# Patient Record
Sex: FEMALE | Race: WHITE | NOT HISPANIC OR LATINO | Employment: OTHER | URBAN - METROPOLITAN AREA
[De-identification: names, ages, dates, MRNs, and addresses within clinical notes are randomized per-mention and may not be internally consistent; named-entity substitution may affect disease eponyms.]

---

## 2019-01-15 ENCOUNTER — OFFICE VISIT (OUTPATIENT)
Dept: MEDICAL GROUP | Facility: PHYSICIAN GROUP | Age: 84
End: 2019-01-15
Payer: MEDICARE

## 2019-01-15 VITALS
HEART RATE: 86 BPM | WEIGHT: 171 LBS | OXYGEN SATURATION: 93 % | HEIGHT: 61 IN | DIASTOLIC BLOOD PRESSURE: 60 MMHG | SYSTOLIC BLOOD PRESSURE: 108 MMHG | RESPIRATION RATE: 14 BRPM | TEMPERATURE: 97.5 F | BODY MASS INDEX: 32.28 KG/M2

## 2019-01-15 DIAGNOSIS — M54.50 ACUTE RIGHT-SIDED LOW BACK PAIN WITHOUT SCIATICA: ICD-10-CM

## 2019-01-15 DIAGNOSIS — E66.9 OBESITY (BMI 30-39.9): ICD-10-CM

## 2019-01-15 DIAGNOSIS — E11.9 TYPE 2 DIABETES MELLITUS WITHOUT COMPLICATION, WITHOUT LONG-TERM CURRENT USE OF INSULIN (HCC): ICD-10-CM

## 2019-01-15 DIAGNOSIS — I87.2 VENOUS STASIS DERMATITIS OF LEFT LOWER EXTREMITY: ICD-10-CM

## 2019-01-15 DIAGNOSIS — K21.9 GASTROESOPHAGEAL REFLUX DISEASE WITHOUT ESOPHAGITIS: ICD-10-CM

## 2019-01-15 PROCEDURE — 99204 OFFICE O/P NEW MOD 45 MIN: CPT | Performed by: FAMILY MEDICINE

## 2019-01-15 RX ORDER — LISINOPRIL 10 MG/1
10 TABLET ORAL DAILY
COMMUNITY
End: 2019-03-20 | Stop reason: SDUPTHER

## 2019-01-15 RX ORDER — LIDOCAINE 50 MG/G
1 PATCH TOPICAL EVERY 24 HOURS
Qty: 10 PATCH | Refills: 1 | Status: SHIPPED | OUTPATIENT
Start: 2019-01-15 | End: 2019-01-15 | Stop reason: SDUPTHER

## 2019-01-15 RX ORDER — LIDOCAINE 50 MG/G
1 PATCH TOPICAL EVERY 24 HOURS
Qty: 10 PATCH | Refills: 1 | Status: SHIPPED | OUTPATIENT
Start: 2019-01-15 | End: 2019-04-16

## 2019-01-15 RX ORDER — CHOLECALCIFEROL (VITAMIN D3) 125 MCG
CAPSULE ORAL
COMMUNITY
End: 2020-03-06 | Stop reason: SDUPTHER

## 2019-01-15 RX ORDER — OMEPRAZOLE 20 MG/1
20 CAPSULE, DELAYED RELEASE ORAL DAILY
COMMUNITY
End: 2019-03-20 | Stop reason: SDUPTHER

## 2019-01-15 RX ORDER — FUROSEMIDE 20 MG/1
20 TABLET ORAL 2 TIMES DAILY
COMMUNITY
End: 2019-01-15

## 2019-01-15 RX ORDER — ALBUTEROL SULFATE 90 UG/1
2 AEROSOL, METERED RESPIRATORY (INHALATION) EVERY 6 HOURS PRN
COMMUNITY
End: 2019-05-23 | Stop reason: SDUPTHER

## 2019-01-15 RX ORDER — FUROSEMIDE 20 MG/1
20 TABLET ORAL 2 TIMES DAILY
COMMUNITY
End: 2019-03-20 | Stop reason: SDUPTHER

## 2019-01-15 ASSESSMENT — PATIENT HEALTH QUESTIONNAIRE - PHQ9: CLINICAL INTERPRETATION OF PHQ2 SCORE: 0

## 2019-01-15 NOTE — ASSESSMENT & PLAN NOTE
"She has had 4-5 days of right low back pain that started after she was coughing one night.  The pain is worse when she stands from a sitting position.  It feels like a \"catch.\"  She denies radiation of the pain.  She has taken naproxen with some relief.    "

## 2019-01-15 NOTE — PATIENT INSTRUCTIONS
"For the back:  Take tylenol 1000 mg bid  Take naproxen 220 mg twice a day to 500 mg twice a day  Consider lidocaine patch        Low Back Sprain Rehab  Ask your health care provider which exercises are safe for you. Do exercises exactly as told by your health care provider and adjust them as directed. It is normal to feel mild stretching, pulling, tightness, or discomfort as you do these exercises, but you should stop right away if you feel sudden pain or your pain gets worse. Do not begin these exercises until told by your health care provider.  Stretching and range of motion exercises  These exercises warm up your muscles and joints and improve the movement and flexibility of your back. These exercises also help to relieve pain, numbness, and tingling.  Exercise A: Lumbar rotation  1. Lie on your back on a firm surface and bend your knees.  2. Straighten your arms out to your sides so each arm forms an \"L\" shape with a side of your body (a 90 degree angle).  3. Slowly move both of your knees to one side of your body until you feel a stretch in your lower back. Try not to let your shoulders move off of the floor.  4. Hold for __________ seconds.  5. Tense your abdominal muscles and slowly move your knees back to the starting position.  6. Repeat this exercise on the other side of your body.  Repeat __________ times. Complete this exercise __________ times a day.  Exercise B: Prone extension on elbows  1. Lie on your abdomen on a firm surface.  2. Prop yourself up on your elbows.  3. Use your arms to help lift your chest up until you feel a gentle stretch in your abdomen and your lower back.  ¨ This will place some of your body weight on your elbows. If this is uncomfortable, try stacking pillows under your chest.  ¨ Your hips should stay down, against the surface that you are lying on. Keep your hip and back muscles relaxed.  4. Hold for __________ seconds.  5. Slowly relax your upper body and return to the " starting position.  Repeat __________ times. Complete this exercise __________ times a day.  Strengthening exercises  These exercises build strength and endurance in your back. Endurance is the ability to use your muscles for a long time, even after they get tired.  Exercise C: Pelvic tilt  1. Lie on your back on a firm surface. Bend your knees and keep your feet flat.  2. Tense your abdominal muscles. Tip your pelvis up toward the ceiling and flatten your lower back into the floor.  ¨ To help with this exercise, you may place a small towel under your lower back and try to push your back into the towel.  3. Hold for __________ seconds.  4. Let your muscles relax completely before you repeat this exercise.  Repeat __________ times. Complete this exercise __________ times a day.  Exercise D: Alternating arm and leg raises  1. Get on your hands and knees on a firm surface. If you are on a hard floor, you may want to use padding to cushion your knees, such as an exercise mat.  2. Line up your arms and legs. Your hands should be below your shoulders, and your knees should be below your hips.  3. Lift your left leg behind you. At the same time, raise your right arm and straighten it in front of you.  ¨ Do not lift your leg higher than your hip.  ¨ Do not lift your arm higher than your shoulder.  ¨ Keep your abdominal and back muscles tight.  ¨ Keep your hips facing the ground.  ¨ Do not arch your back.  ¨ Keep your balance carefully, and do not hold your breath.  4. Hold for __________ seconds.  5. Slowly return to the starting position and repeat with your right leg and your left arm.  Repeat __________ times. Complete this exercise __________ times a day.  Exercise E: Abdominal set with straight leg raise  1. Lie on your back on a firm surface.  2. Bend one of your knees and keep your other leg straight.  3. Tense your abdominal muscles and lift your straight leg up, 4-6 inches (10-15 cm) off the ground.  4. Keep your  abdominal muscles tight and hold for __________ seconds.  ¨ Do not hold your breath.  ¨ Do not arch your back. Keep it flat against the ground.  5. Keep your abdominal muscles tense as you slowly lower your leg back to the starting position.  6. Repeat with your other leg.  Repeat __________ times. Complete this exercise __________ times a day.  Posture and body mechanics     Body mechanics refers to the movements and positions of your body while you do your daily activities. Posture is part of body mechanics. Good posture and healthy body mechanics can help to relieve stress in your body's tissues and joints. Good posture means that your spine is in its natural S-curve position (your spine is neutral), your shoulders are pulled back slightly, and your head is not tipped forward. The following are general guidelines for applying improved posture and body mechanics to your everyday activities.  Standing    · When standing, keep your spine neutral and your feet about hip-width apart. Keep a slight bend in your knees. Your ears, shoulders, and hips should line up.  · When you do a task in which you  one place for a long time, place one foot up on a stable object that is 2-4 inches (5-10 cm) high, such as a footstool. This helps keep your spine neutral.  Sitting  · When sitting, keep your spine neutral and keep your feet flat on the floor. Use a footrest, if necessary, and keep your thighs parallel to the floor. Avoid rounding your shoulders, and avoid tilting your head forward.  · When working at a desk or a computer, keep your desk at a height where your hands are slightly lower than your elbows. Slide your chair under your desk so you are close enough to maintain good posture.  · When working at a computer, place your monitor at a height where you are looking straight ahead and you do not have to tilt your head forward or downward to look at the screen.  Resting    · When lying down and resting, avoid  positions that are most painful for you.  · If you have pain with activities such as sitting, bending, stooping, or squatting (flexion-based activities), lie in a position in which your body does not bend very much. For example, avoid curling up on your side with your arms and knees near your chest (fetal position).  · If you have pain with activities such as standing for a long time or reaching with your arms (extension-based activities), lie with your spine in a neutral position and bend your knees slightly. Try the following positions:  · Lying on your side with a pillow between your knees.  · Lying on your back with a pillow under your knees.  Lifting    · When lifting objects, keep your feet at least shoulder-width apart and tighten your abdominal muscles.  · Bend your knees and hips and keep your spine neutral. It is important to lift using the strength of your legs, not your back. Do not lock your knees straight out.  · Always ask for help to lift heavy or awkward objects.  This information is not intended to replace advice given to you by your health care provider. Make sure you discuss any questions you have with your health care provider.  Document Released: 12/18/2006 Document Revised: 08/24/2017 Document Reviewed: 09/28/2016  Elsevier Interactive Patient Education © 2017 [employer]

## 2019-01-15 NOTE — ASSESSMENT & PLAN NOTE
She takes metformin twice a day, 500 mg.  She reports her fasting glucose is 118-135.  She is on lisinopril for kidney protection.

## 2019-01-15 NOTE — ASSESSMENT & PLAN NOTE
She was started on lasix for this.  She reports some improvement but the skin is still red. She has a steroid cream to use.

## 2019-01-15 NOTE — PROGRESS NOTES
"CC:  Back pain    HISTORY OF THE PRESENT ILLNESS: Patient is a 83 y.o. female. This pleasant patient is here today to discuss the following    Health Maintenance: Completed      Acute right-sided low back pain without sciatica  She has had 4-5 days of right low back pain that started after she was coughing one night.  The pain is worse when she stands from a sitting position.  It feels like a \"catch.\"  She denies radiation of the pain.  She has taken naproxen with some relief.      Type 2 diabetes mellitus without complication, without long-term current use of insulin ([facility])  She takes metformin twice a day, 500 mg.  She reports her fasting glucose is 118-135.  She is on lisinopril for kidney protection.    Venous stasis dermatitis of left lower extremity  She was started on lasix for this.  She reports some improvement but the skin is still red. She has a steroid cream to use.      Gastroesophageal reflux disease without esophagitis  This is stable on omeprazole.  She denies symptoms.       Allergies: Patient has no known allergies.    Current Outpatient Prescriptions Ordered in [facility]   Medication Sig Dispense Refill   • metFORMIN (GLUCOPHAGE) 500 MG Tab Take 500 mg by mouth 2 times a day, with meals.     • omeprazole (PRILOSEC) 20 MG delayed-release capsule Take 20 mg by mouth every day.     • lisinopril (PRINIVIL) 10 MG Tab Take 10 mg by mouth every day.     • Melatonin 5 MG Tab Take  by mouth.     • furosemide (LASIX) 20 MG Tab Take 20 mg by mouth 2 times a day.     • albuterol (PROVENTIL) 2.5mg/0.5ml Nebu Soln 2.5 mg by Nebulization route every four hours as needed.     • albuterol 108 (90 Base) MCG/ACT Aero Soln inhalation aerosol Inhale 2 Puffs by mouth every 6 hours as needed.     • lidocaine (LIDODERM) 5 % Patch Apply 1 Patch to skin as directed every 24 hours. 10 Patch 1     No current Epic-ordered facility-administered medications on file.        Past Medical History:   Diagnosis Date   • Pneumonia     four " times       Past Surgical History:   Procedure Laterality Date   • CHOLECYSTECTOMY  1961   • ABDOMINAL SUBTOTAL HYSTERECTOMY      ovaries remain   • TONSILLECTOMY         Social History   Substance Use Topics   • Smoking status: Former Smoker     Years: 10.00     Types: Cigarettes     Quit date: 1969   • Smokeless tobacco: Never Used   • Alcohol use No       Social History     Social History Narrative    Retired - housekeeping       Family History   Problem Relation Age of Onset   • Cancer Mother         stomach   • Heart Disease Father 65   • Heart Disease Sister    • Cancer Sister         uterine   • Diabetes Brother    • Diabetes Brother    • Heart Attack Brother    • Heart Disease Brother         CABG       ROS:     - Constitutional: Negative for fever, chills, unexpected weight change, and fatigue/generalized weakness.     - HEENT: Negative for headaches, vision changes, hearing changes, ear pain, ear discharge, rhinorrhea, sinus congestion, sore throat, and neck pain.      - Respiratory: Negative for cough, sputum production, chest congestion, dyspnea, wheezing, and crackles.      - Cardiovascular: Negative for chest pain, palpitations, orthopnea, and bilateral lower extremity edema.     - Gastrointestinal: Negative for heartburn, nausea, vomiting, abdominal pain, hematochezia, melena, diarrhea, constipation, and greasy/foul-smelling stools.     - Genitourinary: Negative for dysuria, polyuria, hematuria, pyuria, urinary urgency, and urinary incontinence.    - Musculoskeletal: Negative for myalgias, and joint pain.     - Skin: Negative for rash, itching, cyanotic skin color change.     - Neurological: Negative for dizziness, tingling, tremors, focal sensory deficit, focal weakness and headaches.     - Endo/Heme/Allergies: Does not bruise/bleed easily.     - Psychiatric/Behavioral: Negative for depression, suicidal/homicidal ideation and memory loss.      Exam: Blood pressure 108/60, pulse 86, temperature 36.4  "°C (97.5 °F), resp. rate 14, height 1.549 m (5' 1\"), weight 77.6 kg (171 lb), SpO2 93 %. Body mass index is 32.31 kg/m².    General: Normal appearing. No distress.  HEENT: Normocephalic. Eyes conjunctiva clear lids without ptosis, pupils equal and reactive to light accommodation, ears normal shape and contour, canals are clear bilaterally, tympanic membranes are benign, nasal mucosa benign, oropharynx is without erythema, edema or exudates.   Neck: Supple without JVD or bruit. Thyroid is not enlarged.  Pulmonary: Clear to ausculation.  Normal effort. No rales, ronchi, or wheezing.  Cardiovascular: Regular rate and rhythm without murmur. Carotid and radial pulses are intact and equal bilaterally.  Abdomen: Soft, nontender, nondistended. Normal bowel sounds. Liver and spleen are not palpable  Neurologic: Grossly nonfocal  Lymph: No cervical, supraclavicular or axillary lymph nodes are palpable  Skin: Warm and dry.  Left lower leg with venous stasis dermatitis and varicose veins  Musculoskeletal: Normal gait. No extremity cyanosis, clubbing, or edema.  Psych: Normal mood and affect. Alert and oriented x3. Judgment and insight is normal.    Please note that this dictation was created using voice recognition software. I have made every reasonable attempt to correct obvious errors, but I expect that there are errors of grammar and possibly content that I did not discover before finalizing the note.      Assessment/Plan  1. Acute right-sided low back pain without sciatica  Given mild recurrent nature will postpone imaging.  Discussed management with OTC analgesics, may try lidocaine.  Exam is reassuring.  If pain persists will need images.  No \"RED FLAGS’ for fracture: (Recent major trauma, Minor trauma or strenuous lifting in older or osteoporotic patient, Hx of chronic corticosteroid therapy)  No \"RED FLAGS\" for neoplasm or infection except age: ( Hx of cancer, millie. breast, lung, prostate, Recent fever/chills, Recent " "unexplained weight loss,  Recent bacterial infection, current IV drug use, Immunosuppression (from meds, HIV, etc.), Pain worse when supine or at night)  No \"RED FLAGS’ for cauda equina syndroma: (Saddle anesthesia, urine retention, fecal incontinence, Severe or progressive LE neurologic deficit)    - lidocaine (LIDODERM) 5 % Patch; Apply 1 Patch to skin as directed every 24 hours.  Dispense: 10 Patch; Refill: 1    2. Type 2 diabetes mellitus without complication, without long-term current use of insulin ([facility])  Her fasting glucose is in range.  WE will order labs and continue her current medications.  She is not on a statin, will assess lipids prior to considering.  - HEMOGLOBIN A1C; Future  - COMP METABOLIC PANEL; Future  - MICROALBUMIN CREAT RATIO URINE; Future  - Lipid Profile; Future    3. Venous stasis dermatitis of left lower extremity  She has previously been prescribed a topical steroid which is appropriate.  Will continue daily lasix for now, discussed use of compression socks.    4. Gastroesophageal reflux disease without esophagitis  This is stable, no alarm features.    5. Obesity (BMI 30-39.9)  - Patient identified as having weight management issue.  Appropriate orders and counseling given.    "

## 2019-01-15 NOTE — LETTER
[facility]  Donna Cyr M.D.  [address]  [facility] [address]  Fax: [phone]   Authorization for Release/Disclosure of   Protected Health Information   Name: LEISA WATKINS : 1935 SSN: [id number]   Address: [address] [place] [address] Phone:    [phone] (home)    I authorize the entity listed below to release/disclose the PHI below to:   [facility]/Donna Cyr M.D. and Donna Cyr M.D.   Provider or Entity Name:     Address   City, State, [facility]   Phone:      Fax:     Reason for request: continuity of care   Information to be released:    [  ] LAST COLONOSCOPY,  including any PATH REPORT and follow-up  [  ] LAST FIT/COLOGUARD RESULT [  ] LAST DEXA  [  ] LAST MAMMOGRAM  [  ] LAST PAP  [  ] LAST LABS [  ] RETINA EXAM REPORT  [  ] IMMUNIZATION RECORDS  [ X ] Release all info      [  ] Check here and initial the line next to each item to release ALL health information INCLUDING  _____ Care and treatment for drug and / or alcohol abuse  _____ HIV testing, infection status, or AIDS  _____ Genetic Testing    DATES OF SERVICE OR TIME PERIOD TO BE DISCLOSED: _____________  I understand and acknowledge that:  * This Authorization may be revoked at any time by you in writing, except if your health information has already been used or disclosed.  * Your health information that will be used or disclosed as a result of you signing this authorization could be re-disclosed by the recipient. If this occurs, your re-disclosed health information may no longer be protected by State or Federal laws.  * You may refuse to sign this Authorization. Your refusal will not affect your ability to obtain treatment.  * This Authorization becomes effective upon signing and will  on (date) __________.      If no date is indicated, this Authorization will  one (1) year from the signature date.    Name: Leisa Watkins    Signature:   Date:     1/15/2019       PLEASE FAX  REQUESTED RECORDS BACK TO: [phone]

## 2019-01-28 ENCOUNTER — HOSPITAL ENCOUNTER (OUTPATIENT)
Dept: LAB | Facility: MEDICAL CENTER | Age: 84
End: 2019-01-28
Attending: FAMILY MEDICINE
Payer: MEDICARE

## 2019-01-28 DIAGNOSIS — E11.9 TYPE 2 DIABETES MELLITUS WITHOUT COMPLICATION, WITHOUT LONG-TERM CURRENT USE OF INSULIN (HCC): ICD-10-CM

## 2019-01-28 LAB
ALBUMIN SERPL BCP-MCNC: 3.9 G/DL (ref 3.2–4.9)
ALBUMIN/GLOB SERPL: 1.3 G/DL
ALP SERPL-CCNC: 80 U/L (ref 30–99)
ALT SERPL-CCNC: 12 U/L (ref 2–50)
ANION GAP SERPL CALC-SCNC: 8 MMOL/L (ref 0–11.9)
AST SERPL-CCNC: 23 U/L (ref 12–45)
BILIRUB SERPL-MCNC: 0.5 MG/DL (ref 0.1–1.5)
BUN SERPL-MCNC: 18 MG/DL (ref 8–22)
CALCIUM SERPL-MCNC: 8.9 MG/DL (ref 8.5–10.5)
CHLORIDE SERPL-SCNC: 104 MMOL/L (ref 96–112)
CHOLEST SERPL-MCNC: 209 MG/DL (ref 100–199)
CO2 SERPL-SCNC: 29 MMOL/L (ref 20–33)
CREAT SERPL-MCNC: 0.82 MG/DL (ref 0.5–1.4)
CREAT UR-MCNC: 31.5 MG/DL
EST. AVERAGE GLUCOSE BLD GHB EST-MCNC: 146 MG/DL
GLOBULIN SER CALC-MCNC: 2.9 G/DL (ref 1.9–3.5)
GLUCOSE SERPL-MCNC: 110 MG/DL (ref 65–99)
HBA1C MFR BLD: 6.7 % (ref 0–5.6)
HDLC SERPL-MCNC: 57 MG/DL
LDLC SERPL CALC-MCNC: 138 MG/DL
MICROALBUMIN UR-MCNC: <0.7 MG/DL
MICROALBUMIN/CREAT UR: NORMAL MG/G (ref 0–30)
POTASSIUM SERPL-SCNC: 4.2 MMOL/L (ref 3.6–5.5)
PROT SERPL-MCNC: 6.8 G/DL (ref 6–8.2)
SODIUM SERPL-SCNC: 141 MMOL/L (ref 135–145)
TRIGL SERPL-MCNC: 69 MG/DL (ref 0–149)

## 2019-01-28 PROCEDURE — 82043 UR ALBUMIN QUANTITATIVE: CPT

## 2019-01-28 PROCEDURE — 83036 HEMOGLOBIN GLYCOSYLATED A1C: CPT | Mod: GA

## 2019-01-28 PROCEDURE — 82570 ASSAY OF URINE CREATININE: CPT

## 2019-01-28 PROCEDURE — 80053 COMPREHEN METABOLIC PANEL: CPT

## 2019-01-28 PROCEDURE — 80061 LIPID PANEL: CPT

## 2019-01-28 PROCEDURE — 36415 COLL VENOUS BLD VENIPUNCTURE: CPT | Mod: GA

## 2019-01-30 PROBLEM — E78.2 MIXED HYPERLIPIDEMIA: Status: ACTIVE | Noted: 2019-01-30

## 2019-02-05 ENCOUNTER — TELEPHONE (OUTPATIENT)
Dept: MEDICAL GROUP | Facility: PHYSICIAN GROUP | Age: 84
End: 2019-02-05

## 2019-02-05 NOTE — TELEPHONE ENCOUNTER
Patient called and wanted her lab results. In the results, Dr. Cyr advised she would discuss at her next appt. I advised the patient of the note, I also advised her of the appt that is scheduled. I asked if she would like to be seen earlier, she declined.

## 2019-02-20 ENCOUNTER — TELEPHONE (OUTPATIENT)
Dept: MEDICAL GROUP | Facility: PHYSICIAN GROUP | Age: 84
End: 2019-02-20

## 2019-02-20 NOTE — TELEPHONE ENCOUNTER
MEDICATION PRIOR AUTHORIZATION NEEDED:    1. Name of Medication: Lidocaine patch    2. Requested By (Name of Pharmacy): [facility]     3. Is insurance on file current? Yes    4. What is the name & phone number of the 3rd party payor? N/A    This was completed on 01/2019. This was denied. This is scanned into media.

## 2019-03-20 DIAGNOSIS — E78.2 MIXED HYPERLIPIDEMIA: ICD-10-CM

## 2019-03-20 RX ORDER — FUROSEMIDE 20 MG/1
20 TABLET ORAL 2 TIMES DAILY
Qty: 180 TAB | Refills: 1 | Status: SHIPPED | OUTPATIENT
Start: 2019-03-20 | End: 2019-09-24

## 2019-03-20 RX ORDER — OMEPRAZOLE 20 MG/1
20 CAPSULE, DELAYED RELEASE ORAL DAILY
Qty: 90 CAP | Refills: 1 | Status: SHIPPED | OUTPATIENT
Start: 2019-03-20 | End: 2019-05-08 | Stop reason: SDUPTHER

## 2019-03-20 RX ORDER — LISINOPRIL 10 MG/1
10 TABLET ORAL DAILY
Qty: 90 TAB | Refills: 1 | Status: SHIPPED | OUTPATIENT
Start: 2019-03-20 | End: 2019-05-08 | Stop reason: SDUPTHER

## 2019-03-20 NOTE — TELEPHONE ENCOUNTER
I have reviewed the patient's chart and she is up to date on appointments and labs.  Refills are appropriate and were signed.

## 2019-04-16 ENCOUNTER — OFFICE VISIT (OUTPATIENT)
Dept: MEDICAL GROUP | Facility: PHYSICIAN GROUP | Age: 84
End: 2019-04-16
Payer: MEDICARE

## 2019-04-16 VITALS
HEART RATE: 71 BPM | SYSTOLIC BLOOD PRESSURE: 112 MMHG | BODY MASS INDEX: 32.59 KG/M2 | OXYGEN SATURATION: 94 % | DIASTOLIC BLOOD PRESSURE: 68 MMHG | TEMPERATURE: 99.1 F | RESPIRATION RATE: 16 BRPM | WEIGHT: 166 LBS | HEIGHT: 60 IN

## 2019-04-16 DIAGNOSIS — M25.511 ACUTE PAIN OF RIGHT SHOULDER: ICD-10-CM

## 2019-04-16 DIAGNOSIS — E11.9 TYPE 2 DIABETES MELLITUS WITHOUT COMPLICATION, WITHOUT LONG-TERM CURRENT USE OF INSULIN (HCC): ICD-10-CM

## 2019-04-16 DIAGNOSIS — I87.2 VENOUS STASIS DERMATITIS OF LEFT LOWER EXTREMITY: ICD-10-CM

## 2019-04-16 DIAGNOSIS — E78.2 MIXED HYPERLIPIDEMIA: ICD-10-CM

## 2019-04-16 PROBLEM — M54.50 ACUTE RIGHT-SIDED LOW BACK PAIN WITHOUT SCIATICA: Status: RESOLVED | Noted: 2019-01-15 | Resolved: 2019-04-16

## 2019-04-16 LAB
HBA1C MFR BLD: 6.7 % (ref 0–5.6)
INT CON NEG: ABNORMAL
INT CON POS: ABNORMAL

## 2019-04-16 PROCEDURE — 83036 HEMOGLOBIN GLYCOSYLATED A1C: CPT | Performed by: FAMILY MEDICINE

## 2019-04-16 PROCEDURE — 99214 OFFICE O/P EST MOD 30 MIN: CPT | Performed by: FAMILY MEDICINE

## 2019-04-16 ASSESSMENT — PATIENT HEALTH QUESTIONNAIRE - PHQ9: CLINICAL INTERPRETATION OF PHQ2 SCORE: 0

## 2019-04-16 NOTE — ASSESSMENT & PLAN NOTE
She is on metformin 500 mg twice a day.    Lab Results   Component Value Date/Time    HBA1C 6.7 (A) 04/16/2019 02:07 PM    HBA1C 6.7 (H) 01/28/2019 09:37 AM       Fasting glucose is running in the 120s or less.

## 2019-04-16 NOTE — PROGRESS NOTES
CC: shoulder pain    HISTORY OF PRESENT ILLNESS: Patient is a 83 y.o. female established patient who presents today to discuss the following new and chronic conditions    Health Maintenance: Completed    Type 2 diabetes mellitus without complication, without long-term current use of insulin ([facility])  She is on metformin 500 mg twice a day.    Lab Results   Component Value Date/Time    HBA1C 6.7 (A) 04/16/2019 02:07 PM    HBA1C 6.7 (H) 01/28/2019 09:37 AM       Fasting glucose is running in the 120s or less.      Acute pain of right shoulder  She has right shoulder pain that started a few days ago.  She woke up with pain in the shoulder.  The pain improved with aspercream but has not resolved.  The pain is worse with movement.  The pain starts in her neck and is mostly over the scapula.  She denies changes in numbness or weakness but does have stable carpal tunnel.      Mixed hyperlipidemia  Lab Results   Component Value Date/Time    CHOLSTRLTOT 209 (H) 01/28/2019 09:37 AM     (H) 01/28/2019 09:37 AM    HDL 57 01/28/2019 09:37 AM    TRIGLYCERIDE 69 01/28/2019 09:37 AM       She is likely at increased risk given the diagnosis of diabetes and her age.  She had not been on a statin due to her age.        Patient Active Problem List    Diagnosis Date Noted   • Acute pain of right shoulder 04/16/2019   • Mixed hyperlipidemia 01/30/2019   • Type 2 diabetes mellitus without complication, without long-term current use of insulin ([facility]) 01/15/2019   • Venous stasis dermatitis of left lower extremity 01/15/2019   • Gastroesophageal reflux disease without esophagitis 01/15/2019   • Obesity (BMI 30-39.9) 01/15/2019      Allergies:Patient has no known allergies.    Current Outpatient Prescriptions   Medication Sig Dispense Refill   • furosemide (LASIX) 20 MG Tab Take 1 Tab by mouth 2 times a day. 180 Tab 1   • metFORMIN (GLUCOPHAGE) 500 MG Tab Take 1 Tab by mouth 2 times a day, with meals. 180 Tab 1   • lisinopril (PRINIVIL)  10 MG Tab Take 1 Tab by mouth every day. 90 Tab 1   • omeprazole (PRILOSEC) 20 MG delayed-release capsule Take 1 Cap by mouth every day. 90 Cap 1   • Melatonin 5 MG Tab Take  by mouth.     • albuterol (PROVENTIL) 2.5mg/0.5ml Nebu Soln 2.5 mg by Nebulization route every four hours as needed.     • albuterol 108 (90 Base) MCG/ACT Aero Soln inhalation aerosol Inhale 2 Puffs by mouth every 6 hours as needed.       No current facility-administered medications for this visit.        Social History   Substance Use Topics   • Smoking status: Former Smoker     Years: 10.00     Types: Cigarettes     Quit date: 1969   • Smokeless tobacco: Never Used   • Alcohol use No     Social History     Social History Narrative    Retired - housekeeping       Family History   Problem Relation Age of Onset   • Cancer Mother         stomach   • Heart Disease Father 65   • Heart Disease Sister    • Cancer Sister         uterine   • Diabetes Brother    • Diabetes Brother    • Heart Attack Brother    • Heart Disease Brother         CABG       Review of Systems:      - Constitutional: Negative for fever, chills, unexpected weight change, and fatigue/generalized weakness.     - Respiratory: Negative for cough, sputum production, chest congestion, dyspnea, wheezing, and crackles.      - Cardiovascular: Negative for chest pain, palpitations, orthopnea, and bilateral lower extremity edema.     - Gastrointestinal: Negative for heartburn, nausea, vomiting, abdominal pain, hematochezia, melena, diarrhea, constipation, and greasy/foul-smelling stools.     - Genitourinary: Negative for dysuria, polyuria, hematuria, pyuria, urinary urgency, and urinary incontinence.    - Neurological: Negative for dizziness, tingling, tremors, focal weakness and headaches.         Exam:    /68 (BP Location: Left arm, Patient Position: Sitting, BP Cuff Size: Adult)   Pulse 71   Temp 37.3 °C (99.1 °F) (Temporal)   Resp 16   Ht 1.524 m (5')   Wt 75.3 kg (166 lb)    SpO2 94%  Body mass index is 32.42 kg/m².    General:  Well nourished, well developed female in NAD  Head is grossly normal.  Neck: Supple without JVD or bruit. Thyroid is not enlarged.  Pulmonary: Clear to ausculation and percussion.  Normal effort. No rales, ronchi, or wheezing.  Cardiovascular: Regular rate and rhythm without murmur. Carotid and radial pulses are intact and equal bilaterally.  Musculoskeletal: tenderness along the trapezius muscle on the right, no deformity or palpable abnormality, ROM of shoulder restricted passively and actively due to pain, unable to abduct above 90 degrees  Extremities: no clubbing, cyanosis, or edema.  Monofilament testing with a 10 gram force: sensation intact: intact bilaterally  Visual Inspection: Feet without maceration, ulcers, fissures.  Pedal pulses: decreased bilaterally   Skin: left lower leg with anterior patches of erythema, no eschar or scale    Results for orders placed or performed in visit on 04/16/19   POCT  A1C   Result Value Ref Range    Glycohemoglobin 6.7 (A) 0.0 - 5.6 %    Internal Control Negative Valid     Internal Control Positive Valid          Assessment/Plan:  1. Type 2 diabetes mellitus without complication, without long-term current use of insulin (HCC)  Control is at goal on metformin alone.  Continue current medications.    - POCT  A1C  - Diabetic Monofilament Lower Extremity Exam    2. Mixed hyperlipidemia  We reviewed indications for treatment,  Given the diagnosis of diabetes and her 10 year risk treatment is indicated though treatment in this age group is more risky.  She prefers to remain off the medications.    3. Acute pain of right shoulder  I suspect a muscle spasm vs strain of the neck/trapezius.  Symtpoms are acute and there are no alarm symptoms.  Differential includes impingement or frozen shoulder.  I have asked her to have an xray if symptoms do not improve in the next few weeks.  - DX-SHOULDER 2+ RIGHT; Future    4. Venous  stasis dermatitis of left lower extremity  Symptoms are controlled, continue with topical steroids.

## 2019-04-16 NOTE — ASSESSMENT & PLAN NOTE
Lab Results   Component Value Date/Time    CHOLSTRLTOT 209 (H) 01/28/2019 09:37 AM     (H) 01/28/2019 09:37 AM    HDL 57 01/28/2019 09:37 AM    TRIGLYCERIDE 69 01/28/2019 09:37 AM       She is likely at increased risk given the diagnosis of diabetes and her age.  She had not been on a statin due to her age.

## 2019-04-16 NOTE — LETTER
[facility]  Donna Cyr M.D.  [address]  [facility] [address]  Fax: [phone]   Authorization for Release/Disclosure of   Protected Health Information   Name: LEISA GRAY : 1935 SSN: [id number]   Address: [address] [place] [address] Phone:    [phone] (home)    I authorize the entity listed below to release/disclose the PHI below to:   [facility]/Donna Cyr M.D.   Provider or Entity Name:  [facility]   Address   City, [facility], Zip  [address] [place] [address] Phone:         [phone]    Fax:         [phone]   Reason for request: continuity of care   Information to be released:    [  ] LAST COLONOSCOPY,  including any PATH REPORT and follow-up  [  ] LAST FIT/COLOGUARD RESULT [  ] LAST DEXA  [  ] LAST MAMMOGRAM  [  ] LAST PAP  [  ] LAST LABS [X] RETINA EXAM REPORT  [  ] IMMUNIZATION RECORDS  [  ] Release all info      [  ] Check here and initial the line next to each item to release ALL health information INCLUDING  _____ Care and treatment for drug and / or alcohol abuse  _____ HIV testing, infection status, or AIDS  _____ Genetic Testing    DATES OF SERVICE OR TIME PERIOD TO BE DISCLOSED: __1 YEAR________  I understand and acknowledge that:  * This Authorization may be revoked at any time by you in writing, except if your health information has already been used or disclosed.  * Your health information that will be used or disclosed as a result of you signing this authorization could be re-disclosed by the recipient. If this occurs, your re-disclosed health information may no longer be protected by State or Federal laws.  * You may refuse to sign this Authorization. Your refusal will not affect your ability to obtain treatment.  * This Authorization becomes effective upon signing and will  on (date) __________.      If no date is indicated, this Authorization will  one (1) year from the signature date.       Name: Jaylin Watkins    Signature:    CONTINUITY OF CARE   Date:     4/22/2019       PLEASE FAX REQUESTED RECORDS BACK TO: [phone]

## 2019-04-16 NOTE — ASSESSMENT & PLAN NOTE
She has right shoulder pain that started a few days ago.  She woke up with pain in the shoulder.  The pain improved with aspercream but has not resolved.  The pain is worse with movement.  The pain starts in her neck and is mostly over the scapula.  She denies changes in numbness or weakness but does have stable carpal tunnel.

## 2019-04-16 NOTE — LETTER
[facility]  Donna Cyr M.D.  [address]  [facility] [address]  Fax: [phone]   Authorization for Release/Disclosure of   Protected Health Information   Name: LEISA WATKINS : 1935 SSN: [id number]   Address: [address] [place] [address] Phone:    [phone] (home)    I authorize the entity listed below to release/disclose the PHI below to:   [facility]/Donna Cyr M.D. and Donna Cyr M.D.   Provider or Entity Name:  Dr. Parisi   Address   [facility], [facility]   Phone:      Fax:     Reason for request: continuity of care   Information to be released:    [  ] LAST COLONOSCOPY,  including any PATH REPORT and follow-up  [  ] LAST FIT/COLOGUARD RESULT [  ] LAST DEXA  [  ] LAST MAMMOGRAM  [  ] LAST PAP  [  ] LAST LABS [  ] RETINA EXAM REPORT  [  ] IMMUNIZATION RECORDS  [  ] Release all info      [  ] Check here and initial the line next to each item to release ALL health information INCLUDING  _____ Care and treatment for drug and / or alcohol abuse  _____ HIV testing, infection status, or AIDS  _____ Genetic Testing    DATES OF SERVICE OR TIME PERIOD TO BE DISCLOSED: _____________  I understand and acknowledge that:  * This Authorization may be revoked at any time by you in writing, except if your health information has already been used or disclosed.  * Your health information that will be used or disclosed as a result of you signing this authorization could be re-disclosed by the recipient. If this occurs, your re-disclosed health information may no longer be protected by State or Federal laws.  * You may refuse to sign this Authorization. Your refusal will not affect your ability to obtain treatment.  * This Authorization becomes effective upon signing and will  on (date) __________.      If no date is indicated, this Authorization will  one (1) year from the signature date.    Name: Leisa Watkins    Signature:   Date:     2019            PLEASE FAX REQUESTED RECORDS BACK TO: [phone]

## 2019-05-08 DIAGNOSIS — E78.2 MIXED HYPERLIPIDEMIA: ICD-10-CM

## 2019-05-08 RX ORDER — OMEPRAZOLE 20 MG/1
20 CAPSULE, DELAYED RELEASE ORAL DAILY
Qty: 90 CAP | Refills: 1 | Status: SHIPPED | OUTPATIENT
Start: 2019-05-08 | End: 2019-09-24 | Stop reason: SDUPTHER

## 2019-05-08 RX ORDER — LISINOPRIL 10 MG/1
10 TABLET ORAL DAILY
Qty: 90 TAB | Refills: 1 | Status: SHIPPED | OUTPATIENT
Start: 2019-05-08 | End: 2019-09-24

## 2019-05-08 NOTE — TELEPHONE ENCOUNTER
Was the patient seen in the last year in this department? Yes    Does patient have an active prescription for medications requested? Yes    Received Request Via: Pharmacy     Last Visit: 4/16/18  Last Labs: 1/28/19

## 2019-05-23 NOTE — TELEPHONE ENCOUNTER
Was the patient seen in the last year in this department? Yes    Does patient have an active prescription for medications requested? Yes    Received Request Via: Pharmacy    Office Visit on 04/16/2019   Component Date Value   • Glycohemoglobin 04/16/2019 6.7*   • Internal Control Negative 04/16/2019 Valid    • Internal Control Positive 04/16/2019 Valid    Hospital Outpatient Visit on 01/28/2019   Component Date Value   • Glycohemoglobin 01/28/2019 6.7*   • Est Avg Glucose 01/28/2019 146    • Sodium 01/28/2019 141    • Potassium 01/28/2019 4.2    • Chloride 01/28/2019 104    • Co2 01/28/2019 29    • Anion Gap 01/28/2019 8.0    • Glucose 01/28/2019 110*   • Bun 01/28/2019 18    • Creatinine 01/28/2019 0.82    • Calcium 01/28/2019 8.9    • AST(SGOT) 01/28/2019 23    • ALT(SGPT) 01/28/2019 12    • Alkaline Phosphatase 01/28/2019 80    • Total Bilirubin 01/28/2019 0.5    • Albumin 01/28/2019 3.9    • Total Protein 01/28/2019 6.8    • Globulin 01/28/2019 2.9    • A-G Ratio 01/28/2019 1.3    • Creatinine, Urine 01/28/2019 31.50    • Microalbumin, Urine Rand* 01/28/2019 <0.7    • Micro Alb Creat Ratio 01/28/2019 see below    • Cholesterol,Tot 01/28/2019 209*   • Triglycerides 01/28/2019 69    • HDL 01/28/2019 57    • LDL 01/28/2019 138*   • GFR If  01/28/2019 >60    • GFR If Non  Ameri* 01/28/2019 >60    ]

## 2019-05-24 RX ORDER — ALBUTEROL SULFATE 90 UG/1
2 AEROSOL, METERED RESPIRATORY (INHALATION) EVERY 6 HOURS PRN
Qty: 8.5 G | Refills: 0 | Status: SHIPPED | OUTPATIENT
Start: 2019-05-24 | End: 2020-03-06 | Stop reason: SDUPTHER

## 2019-09-24 ENCOUNTER — OFFICE VISIT (OUTPATIENT)
Dept: MEDICAL GROUP | Facility: PHYSICIAN GROUP | Age: 84
End: 2019-09-24
Payer: MEDICARE

## 2019-09-24 VITALS
HEART RATE: 78 BPM | WEIGHT: 162.8 LBS | TEMPERATURE: 98.3 F | BODY MASS INDEX: 30.73 KG/M2 | RESPIRATION RATE: 16 BRPM | DIASTOLIC BLOOD PRESSURE: 46 MMHG | SYSTOLIC BLOOD PRESSURE: 80 MMHG | HEIGHT: 61 IN | OXYGEN SATURATION: 94 %

## 2019-09-24 DIAGNOSIS — G56.03 BILATERAL CARPAL TUNNEL SYNDROME: ICD-10-CM

## 2019-09-24 DIAGNOSIS — Z23 NEED FOR VACCINATION: ICD-10-CM

## 2019-09-24 DIAGNOSIS — N95.9 MENOPAUSAL AND POSTMENOPAUSAL DISORDER: ICD-10-CM

## 2019-09-24 DIAGNOSIS — I87.2 VENOUS STASIS DERMATITIS OF LEFT LOWER EXTREMITY: ICD-10-CM

## 2019-09-24 DIAGNOSIS — E78.2 MIXED HYPERLIPIDEMIA: ICD-10-CM

## 2019-09-24 DIAGNOSIS — Z12.39 BREAST CANCER SCREENING: ICD-10-CM

## 2019-09-24 DIAGNOSIS — E11.9 TYPE 2 DIABETES MELLITUS WITHOUT COMPLICATION, WITHOUT LONG-TERM CURRENT USE OF INSULIN (HCC): ICD-10-CM

## 2019-09-24 DIAGNOSIS — R63.4 WEIGHT LOSS, UNINTENTIONAL: ICD-10-CM

## 2019-09-24 PROBLEM — M25.511 ACUTE PAIN OF RIGHT SHOULDER: Status: RESOLVED | Noted: 2019-04-16 | Resolved: 2019-09-24

## 2019-09-24 LAB
HBA1C MFR BLD: 5.9 % (ref 0–5.6)
INT CON NEG: ABNORMAL
INT CON POS: ABNORMAL

## 2019-09-24 PROCEDURE — G0008 ADMIN INFLUENZA VIRUS VAC: HCPCS | Performed by: FAMILY MEDICINE

## 2019-09-24 PROCEDURE — 90662 IIV NO PRSV INCREASED AG IM: CPT | Performed by: FAMILY MEDICINE

## 2019-09-24 PROCEDURE — 99214 OFFICE O/P EST MOD 30 MIN: CPT | Mod: 25 | Performed by: FAMILY MEDICINE

## 2019-09-24 PROCEDURE — 83036 HEMOGLOBIN GLYCOSYLATED A1C: CPT | Performed by: FAMILY MEDICINE

## 2019-09-24 RX ORDER — LISINOPRIL 5 MG/1
5 TABLET ORAL DAILY
Qty: 90 TAB | Refills: 0 | Status: SHIPPED | OUTPATIENT
Start: 2019-09-24 | End: 2019-10-31 | Stop reason: SDUPTHER

## 2019-09-24 RX ORDER — ROSUVASTATIN CALCIUM 5 MG/1
5 TABLET, COATED ORAL EVERY EVENING
Qty: 90 TAB | Refills: 0 | Status: SHIPPED | OUTPATIENT
Start: 2019-09-24 | End: 2019-10-31 | Stop reason: SDUPTHER

## 2019-09-24 RX ORDER — OMEPRAZOLE 20 MG/1
20 CAPSULE, DELAYED RELEASE ORAL DAILY
Qty: 90 CAP | Refills: 1 | Status: SHIPPED | OUTPATIENT
Start: 2019-09-24 | End: 2019-10-31 | Stop reason: SDUPTHER

## 2019-09-24 RX ORDER — METFORMIN HYDROCHLORIDE 500 MG/1
500 TABLET, EXTENDED RELEASE ORAL DAILY
Qty: 90 TAB | Refills: 0 | Status: SHIPPED | OUTPATIENT
Start: 2019-09-24 | End: 2019-10-31 | Stop reason: SDUPTHER

## 2019-09-24 RX ORDER — FUROSEMIDE 20 MG/1
20 TABLET ORAL DAILY
Qty: 90 TAB | Refills: 0 | Status: SHIPPED | OUTPATIENT
Start: 2019-09-24 | End: 2019-10-31 | Stop reason: SDUPTHER

## 2019-09-24 NOTE — ASSESSMENT & PLAN NOTE
She is here to follow up DM.  She is on metformin bid only.  She had a recent eye exam in [place] but the records are not available yet.  She was told it was normal.  She is not on a statin because her last PCP felt she was too old, but she is concerned about her risk  She is on lisinopril for BP/kidney protection.  Labs are up to date.  Lab Results   Component Value Date/Time    HBA1C 5.9 (A) 09/24/2019 02:15 PM    HBA1C 6.7 (A) 04/16/2019 02:07 PM

## 2019-09-24 NOTE — PROGRESS NOTES
CC: diabetes, weight loss    HISTORY OF PRESENT ILLNESS: Patient is a 84 y.o. female established patient who presents today to discuss the following    Health Maintenance: Completed    Weight loss, unintentional  She has lost 15 lb in the last year.  She denies abdominal pain or appetite change.  She denies bowel habit changes or fevers.    Type 2 diabetes mellitus without complication, without long-term current use of insulin ([facility])  She is here to follow up DM.  She is on metformin bid only.  She had a recent eye exam in [place] but the records are not available yet.  She was told it was normal.  She is not on a statin because her last PCP felt she was too old, but she is concerned about her risk  She is on lisinopril for BP/kidney protection.  Labs are up to date.  Lab Results   Component Value Date/Time    HBA1C 5.9 (A) 09/24/2019 02:15 PM    HBA1C 6.7 (A) 04/16/2019 02:07 PM         Venous stasis dermatitis of left lower extremity  She is taking lasix 20 mg bid for left leg swelling.  She believes once a day dosing will take care of symptoms.  She has been feeling slightly dizzy at times.  Her BP is low today.    Bilateral carpal tunnel syndrome  She describe bilateral numbness and pain in her thumb through middle finger.  It is almost constant.  She is not using braces.  She has been told she has carpal tunnel before.        Patient Active Problem List    Diagnosis Date Noted   • Macrocytosis without anemia 10/07/2019   • Elevated alkaline phosphatase level 10/07/2019   • Osteopenia of lumbar spine 10/07/2019   • Bilateral carpal tunnel syndrome 10/07/2019   • Weight loss, unintentional 09/24/2019   • Mixed hyperlipidemia 01/30/2019   • Type 2 diabetes mellitus without complication, without long-term current use of insulin (HCC) 01/15/2019   • Venous stasis dermatitis of left lower extremity 01/15/2019   • Gastroesophageal reflux disease without esophagitis 01/15/2019   • Obesity (BMI 30-39.9) 01/15/2019       Allergies:Patient has no known allergies.    Current Outpatient Medications   Medication Sig Dispense Refill   • rosuvastatin (CRESTOR) 5 MG Tab Take 1 Tab by mouth every evening. 90 Tab 0   • furosemide (LASIX) 20 MG Tab Take 1 Tab by mouth every day. 90 Tab 0   • lisinopril (PRINIVIL) 5 MG Tab Take 1 Tab by mouth every day. 90 Tab 0   • metFORMIN ER (GLUCOPHAGE XR) 500 MG TABLET SR 24 HR Take 1 Tab by mouth every day. 90 Tab 0   • omeprazole (PRILOSEC) 20 MG delayed-release capsule Take 1 Cap by mouth every day. 90 Cap 1   • albuterol 108 (90 Base) MCG/ACT Aero Soln inhalation aerosol Inhale 2 Puffs by mouth every 6 hours as needed. 8.5 g 0   • Melatonin 5 MG Tab Take  by mouth.     • albuterol (PROVENTIL) 2.5mg/0.5ml Nebu Soln 2.5 mg by Nebulization route every four hours as needed.       No current facility-administered medications for this visit.        Social History     Tobacco Use   • Smoking status: Former Smoker     Years: 10.00     Types: Cigarettes     Last attempt to quit: 1969     Years since quittin.7   • Smokeless tobacco: Never Used   Substance Use Topics   • Alcohol use: No   • Drug use: No     Social History     Social History Narrative    Retired - housekeeping       Family History   Problem Relation Age of Onset   • Cancer Mother         stomach   • Heart Disease Father 65   • Heart Disease Sister    • Cancer Sister         uterine   • Diabetes Brother    • Diabetes Brother    • Heart Attack Brother    • Heart Disease Brother         CABG       Review of Systems:      - Constitutional: Negative for fever, chills, and fatigue/generalized weakness.     - HEENT: Negative for headaches, vision changes, hearing changes, ear pain, ear discharge, rhinorrhea, sinus congestion, sore throat, and neck pain.      - Respiratory: Negative for cough, sputum production, chest congestion, dyspnea, wheezing, and crackles.      - Cardiovascular: Negative for chest pain, palpitations, orthopnea, and  "bilateral lower extremity edema.     - Gastrointestinal: Negative for heartburn, nausea, vomiting, abdominal pain, hematochezia, melena, diarrhea, constipation, and greasy/foul-smelling stools.     - Genitourinary: Negative for dysuria, polyuria, hematuria, pyuria, urinary urgency, and urinary incontinence.    - Neurological: Negative for dizziness, tremors, focal sensory deficit, focal weakness and headaches.     - Endo/Heme/Allergies: Does not bruise/bleed easily.     - Psychiatric/Behavioral: Negative for depression, suicidal/homicidal ideation and memory loss.        Exam:    BP (!) 80/46   Pulse 78   Temp 36.8 °C (98.3 °F) (Temporal)   Resp 16   Ht 1.549 m (5' 1\")   Wt 73.8 kg (162 lb 12.8 oz)   SpO2 94%  Body mass index is 30.76 kg/m².    General:  Well nourished, well developed female in NAD  Head is grossly normal.  Neck: Supple without JVD or bruit. Thyroid is not enlarged.  Pulmonary: Clear to ausculation and percussion.  Normal effort. No rales, ronchi, or wheezing.  Cardiovascular: Regular rate and rhythm without murmur. Carotid and radial pulses are intact and equal bilaterally.  Extremities: no clubbing, cyanosis, or edema.  Musculoskeletal: bilateral tinel and phalen positiven, no atrophy or numbness      Assessment/Plan:  1. Type 2 diabetes mellitus without complication, without long-term current use of insulin (HCC)  Continue current medications, this is excellently controlled on metformin only  - POCT  A1C  - lisinopril (PRINIVIL) 5 MG Tab; Take 1 Tab by mouth every day.  Dispense: 90 Tab; Refill: 0  - metFORMIN ER (GLUCOPHAGE XR) 500 MG TABLET SR 24 HR; Take 1 Tab by mouth every day.  Dispense: 90 Tab; Refill: 0  - Comp Metabolic Panel; Future  - Lipid Profile; Future  - CBC WITH DIFFERENTIAL; Future    2. Mixed hyperlipidemia  Continue current crestor, will follow lipids.  - rosuvastatin (CRESTOR) 5 MG Tab; Take 1 Tab by mouth every evening.  Dispense: 90 Tab; Refill: 0  - omeprazole " (PRILOSEC) 20 MG delayed-release capsule; Take 1 Cap by mouth every day.  Dispense: 90 Cap; Refill: 1  - Lipid Profile; Future    3. Venous stasis dermatitis of left lower extremity  Continue lasix  - furosemide (LASIX) 20 MG Tab; Take 1 Tab by mouth every day.  Dispense: 90 Tab; Refill: 0    4. Need for vaccination  - INFLUENZA VACCINE, HIGH DOSE (65+ ONLY)    5. Weight loss, unintentional  There is no obvious cause based on symptoms.  We will check labs and make sure cancer screening is up to date as appropriate.  - CBC WITH DIFFERENTIAL; Future    6. Breast cancer screening  - MA-SCREENING MAMMO BILAT W/TOMOSYNTHESIS W/CAD; Future    7. Menopausal and postmenopausal disorder  - DS-BONE DENSITY STUDY (DEXA)    8. Bilateral carpal tunnel syndrome  Start bracing at night and with activity, will get nerve conduction study.  If not improving with bracing will need surgery referral.  - Nerve Conduction Study  - Wrist Brace

## 2019-09-24 NOTE — ASSESSMENT & PLAN NOTE
She is taking lasix 20 mg bid for left leg swelling.  She believes once a day dosing will take care of symptoms.  She has been feeling slightly dizzy at times.  Her BP is low today.

## 2019-09-24 NOTE — ASSESSMENT & PLAN NOTE
She has lost 15 lb in the last year.  She denies abdominal pain or appetite change.  She denies bowel habit changes or fevers.

## 2019-09-30 ENCOUNTER — HOSPITAL ENCOUNTER (OUTPATIENT)
Dept: LAB | Facility: MEDICAL CENTER | Age: 84
End: 2019-09-30
Attending: FAMILY MEDICINE
Payer: MEDICARE

## 2019-09-30 DIAGNOSIS — R63.4 WEIGHT LOSS, UNINTENTIONAL: ICD-10-CM

## 2019-09-30 DIAGNOSIS — E11.9 TYPE 2 DIABETES MELLITUS WITHOUT COMPLICATION, WITHOUT LONG-TERM CURRENT USE OF INSULIN (HCC): ICD-10-CM

## 2019-09-30 DIAGNOSIS — E78.2 MIXED HYPERLIPIDEMIA: ICD-10-CM

## 2019-09-30 LAB
ALBUMIN SERPL BCP-MCNC: 4.3 G/DL (ref 3.2–4.9)
ALBUMIN/GLOB SERPL: 1.7 G/DL
ALP SERPL-CCNC: 103 U/L (ref 30–99)
ALT SERPL-CCNC: 11 U/L (ref 2–50)
ANION GAP SERPL CALC-SCNC: 9 MMOL/L (ref 0–11.9)
AST SERPL-CCNC: 19 U/L (ref 12–45)
BASOPHILS # BLD AUTO: 0.8 % (ref 0–1.8)
BASOPHILS # BLD: 0.06 K/UL (ref 0–0.12)
BILIRUB SERPL-MCNC: 0.7 MG/DL (ref 0.1–1.5)
BUN SERPL-MCNC: 19 MG/DL (ref 8–22)
CALCIUM SERPL-MCNC: 9 MG/DL (ref 8.5–10.5)
CHLORIDE SERPL-SCNC: 100 MMOL/L (ref 96–112)
CHOLEST SERPL-MCNC: 131 MG/DL (ref 100–199)
CO2 SERPL-SCNC: 29 MMOL/L (ref 20–33)
CREAT SERPL-MCNC: 0.77 MG/DL (ref 0.5–1.4)
EOSINOPHIL # BLD AUTO: 0.04 K/UL (ref 0–0.51)
EOSINOPHIL NFR BLD: 0.6 % (ref 0–6.9)
ERYTHROCYTE [DISTWIDTH] IN BLOOD BY AUTOMATED COUNT: 51.4 FL (ref 35.9–50)
FASTING STATUS PATIENT QL REPORTED: NORMAL
GLOBULIN SER CALC-MCNC: 2.5 G/DL (ref 1.9–3.5)
GLUCOSE SERPL-MCNC: 92 MG/DL (ref 65–99)
HCT VFR BLD AUTO: 42 % (ref 37–47)
HDLC SERPL-MCNC: 59 MG/DL
HGB BLD-MCNC: 13.5 G/DL (ref 12–16)
IMM GRANULOCYTES # BLD AUTO: 0.02 K/UL (ref 0–0.11)
IMM GRANULOCYTES NFR BLD AUTO: 0.3 % (ref 0–0.9)
LDLC SERPL CALC-MCNC: 58 MG/DL
LYMPHOCYTES # BLD AUTO: 1.81 K/UL (ref 1–4.8)
LYMPHOCYTES NFR BLD: 25.4 % (ref 22–41)
MCH RBC QN AUTO: 31.5 PG (ref 27–33)
MCHC RBC AUTO-ENTMCNC: 32.1 G/DL (ref 33.6–35)
MCV RBC AUTO: 97.9 FL (ref 81.4–97.8)
MONOCYTES # BLD AUTO: 0.73 K/UL (ref 0–0.85)
MONOCYTES NFR BLD AUTO: 10.2 % (ref 0–13.4)
NEUTROPHILS # BLD AUTO: 4.48 K/UL (ref 2–7.15)
NEUTROPHILS NFR BLD: 62.7 % (ref 44–72)
NRBC # BLD AUTO: 0 K/UL
NRBC BLD-RTO: 0 /100 WBC
PLATELET # BLD AUTO: 207 K/UL (ref 164–446)
PMV BLD AUTO: 9.6 FL (ref 9–12.9)
POTASSIUM SERPL-SCNC: 3.7 MMOL/L (ref 3.6–5.5)
PROT SERPL-MCNC: 6.8 G/DL (ref 6–8.2)
RBC # BLD AUTO: 4.29 M/UL (ref 4.2–5.4)
SODIUM SERPL-SCNC: 138 MMOL/L (ref 135–145)
TRIGL SERPL-MCNC: 68 MG/DL (ref 0–149)
WBC # BLD AUTO: 7.1 K/UL (ref 4.8–10.8)

## 2019-09-30 PROCEDURE — 36415 COLL VENOUS BLD VENIPUNCTURE: CPT

## 2019-09-30 PROCEDURE — 80061 LIPID PANEL: CPT

## 2019-09-30 PROCEDURE — 80053 COMPREHEN METABOLIC PANEL: CPT

## 2019-09-30 PROCEDURE — 85025 COMPLETE CBC W/AUTO DIFF WBC: CPT

## 2019-10-01 ENCOUNTER — HOSPITAL ENCOUNTER (OUTPATIENT)
Dept: RADIOLOGY | Facility: MEDICAL CENTER | Age: 84
End: 2019-10-01
Attending: FAMILY MEDICINE
Payer: MEDICARE

## 2019-10-01 PROCEDURE — 77080 DXA BONE DENSITY AXIAL: CPT

## 2019-10-07 DIAGNOSIS — R74.8 ELEVATED ALKALINE PHOSPHATASE LEVEL: ICD-10-CM

## 2019-10-07 DIAGNOSIS — D75.89 MACROCYTOSIS WITHOUT ANEMIA: ICD-10-CM

## 2019-10-07 PROBLEM — M85.88 OSTEOPENIA OF LUMBAR SPINE: Status: ACTIVE | Noted: 2019-10-07

## 2019-10-07 PROBLEM — G56.03 BILATERAL CARPAL TUNNEL SYNDROME: Status: ACTIVE | Noted: 2019-10-07

## 2019-10-08 NOTE — ASSESSMENT & PLAN NOTE
She describe bilateral numbness and pain in her thumb through middle finger.  It is almost constant.  She is not using braces.  She has been told she has carpal tunnel before.

## 2019-10-31 ENCOUNTER — OFFICE VISIT (OUTPATIENT)
Dept: MEDICAL GROUP | Facility: PHYSICIAN GROUP | Age: 84
End: 2019-10-31
Payer: MEDICARE

## 2019-10-31 VITALS
TEMPERATURE: 98.9 F | BODY MASS INDEX: 31.34 KG/M2 | HEART RATE: 68 BPM | SYSTOLIC BLOOD PRESSURE: 120 MMHG | HEIGHT: 61 IN | DIASTOLIC BLOOD PRESSURE: 90 MMHG | WEIGHT: 166 LBS | OXYGEN SATURATION: 95 %

## 2019-10-31 DIAGNOSIS — E11.9 TYPE 2 DIABETES MELLITUS WITHOUT COMPLICATION, WITHOUT LONG-TERM CURRENT USE OF INSULIN (HCC): ICD-10-CM

## 2019-10-31 DIAGNOSIS — R22.1 LUMP IN NECK: ICD-10-CM

## 2019-10-31 DIAGNOSIS — F40.243 ANXIETY WITH FLYING: ICD-10-CM

## 2019-10-31 DIAGNOSIS — I87.2 VENOUS STASIS DERMATITIS OF LEFT LOWER EXTREMITY: ICD-10-CM

## 2019-10-31 DIAGNOSIS — E78.2 MIXED HYPERLIPIDEMIA: ICD-10-CM

## 2019-10-31 PROCEDURE — 99214 OFFICE O/P EST MOD 30 MIN: CPT | Performed by: FAMILY MEDICINE

## 2019-10-31 RX ORDER — HYDROXYZINE HYDROCHLORIDE 25 MG/1
25 TABLET, FILM COATED ORAL 3 TIMES DAILY PRN
Qty: 15 TAB | Refills: 0 | Status: SHIPPED | OUTPATIENT
Start: 2019-10-31 | End: 2020-03-06 | Stop reason: SDUPTHER

## 2019-10-31 RX ORDER — LISINOPRIL 5 MG/1
5 TABLET ORAL DAILY
Qty: 90 TAB | Refills: 0 | Status: SHIPPED | OUTPATIENT
Start: 2019-10-31 | End: 2020-03-06 | Stop reason: SDUPTHER

## 2019-10-31 RX ORDER — METFORMIN HYDROCHLORIDE 500 MG/1
500 TABLET, EXTENDED RELEASE ORAL DAILY
Qty: 90 TAB | Refills: 0 | Status: SHIPPED | OUTPATIENT
Start: 2019-10-31 | End: 2020-03-06 | Stop reason: SDUPTHER

## 2019-10-31 RX ORDER — OMEPRAZOLE 20 MG/1
20 CAPSULE, DELAYED RELEASE ORAL DAILY
Qty: 90 CAP | Refills: 1 | Status: SHIPPED | OUTPATIENT
Start: 2019-10-31 | End: 2020-03-06 | Stop reason: SDUPTHER

## 2019-10-31 RX ORDER — ROSUVASTATIN CALCIUM 5 MG/1
5 TABLET, COATED ORAL EVERY EVENING
Qty: 90 TAB | Refills: 0 | Status: SHIPPED | OUTPATIENT
Start: 2019-10-31 | End: 2020-03-06 | Stop reason: SDUPTHER

## 2019-10-31 RX ORDER — FUROSEMIDE 20 MG/1
20 TABLET ORAL DAILY
Qty: 90 TAB | Refills: 0 | Status: SHIPPED | OUTPATIENT
Start: 2019-10-31 | End: 2020-03-06 | Stop reason: SDUPTHER

## 2019-10-31 NOTE — ASSESSMENT & PLAN NOTE
She has a lump on the left side of her neck.  She was told it was a swollen glad that would go away, but it has not.  It has not changed in size.  She denies weight loss or night sweats.

## 2019-10-31 NOTE — ASSESSMENT & PLAN NOTE
She has started taking metformin at night instead.  Her fasting glucose has been 120-150.  She is not feeling different.   Lab Results   Component Value Date/Time    HBA1C 5.9 (A) 09/24/2019 02:15 PM    HBA1C 6.7 (A) 04/16/2019 02:07 PM

## 2019-11-11 PROBLEM — F40.243 ANXIETY WITH FLYING: Status: ACTIVE | Noted: 2019-11-11

## 2019-11-12 NOTE — ASSESSMENT & PLAN NOTE
She has a flight coming up but gets anxious when she flies and would like something to take.  She will have wheelchair assistance to her seat through the airlines throughout the flight.

## 2019-11-12 NOTE — PROGRESS NOTES
CC: lump in neck    HISTORY OF PRESENT ILLNESS: Patient is a 84 y.o. female established patient who presents today to discuss the following    Health Maintenance: Completed    Type 2 diabetes mellitus without complication, without long-term current use of insulin ([facility])  She has started taking metformin at night instead.  Her fasting glucose has been 120-150.  She is not feeling different.   Lab Results   Component Value Date/Time    HBA1C 5.9 (A) 09/24/2019 02:15 PM    HBA1C 6.7 (A) 04/16/2019 02:07 PM          Lump in neck  She has a lump on the left side of her neck.  She was told it was a swollen glad that would go away, but it has not.  It has not changed in size.  She denies weight loss or night sweats.    Venous stasis dermatitis of left lower extremity  She has swelling at times in the left lower leg with a rash. She is using a topical steroid that helps.      Patient Active Problem List    Diagnosis Date Noted   • Lump in neck 10/31/2019   • Macrocytosis without anemia 10/07/2019   • Elevated alkaline phosphatase level 10/07/2019   • Osteopenia of lumbar spine 10/07/2019   • Bilateral carpal tunnel syndrome 10/07/2019   • Weight loss, unintentional 09/24/2019   • Mixed hyperlipidemia 01/30/2019   • Type 2 diabetes mellitus without complication, without long-term current use of insulin ([facility]) 01/15/2019   • Venous stasis dermatitis of left lower extremity 01/15/2019   • Gastroesophageal reflux disease without esophagitis 01/15/2019   • Obesity (BMI 30-39.9) 01/15/2019      Allergies:Patient has no known allergies.    Current Outpatient Medications   Medication Sig Dispense Refill   • metFORMIN ER (GLUCOPHAGE XR) 500 MG TABLET SR 24 HR Take 1 Tab by mouth every day. 90 Tab 0   • omeprazole (PRILOSEC) 20 MG delayed-release capsule Take 1 Cap by mouth every day. 90 Cap 1   • rosuvastatin (CRESTOR) 5 MG Tab Take 1 Tab by mouth every evening. 90 Tab 0   • lisinopril (PRINIVIL) 5 MG Tab Take 1 Tab by mouth every  "day. 90 Tab 0   • furosemide (LASIX) 20 MG Tab Take 1 Tab by mouth every day. 90 Tab 0   • hydrOXYzine HCl (ATARAX) 25 MG Tab Take 1 Tab by mouth 3 times a day as needed for Anxiety. 15 Tab 0   • albuterol 108 (90 Base) MCG/ACT Aero Soln inhalation aerosol Inhale 2 Puffs by mouth every 6 hours as needed. 8.5 g 0   • Melatonin 5 MG Tab Take  by mouth.     • albuterol (PROVENTIL) 2.5mg/0.5ml Nebu Soln 2.5 mg by Nebulization route every four hours as needed.       No current facility-administered medications for this visit.        Social History     Tobacco Use   • Smoking status: Former Smoker     Years: 10.00     Types: Cigarettes     Last attempt to quit: 1969     Years since quittin.8   • Smokeless tobacco: Never Used   Substance Use Topics   • Alcohol use: No   • Drug use: No     Social History     Patient does not qualify to have social determinant information on file (likely too young).   Social History Narrative    Retired - housekeeping       Family History   Problem Relation Age of Onset   • Cancer Mother         stomach   • Heart Disease Father 65   • Heart Disease Sister    • Cancer Sister         uterine   • Diabetes Brother    • Diabetes Brother    • Heart Attack Brother    • Heart Disease Brother         CABG       Review of Systems:      - Constitutional: Negative for fever, chills, unexpected weight change, and fatigue/generalized weakness.     - HEENT: Negative for headaches, vision changes, hearing changes, ear pain, ear discharge, rhinorrhea, sinus congestion, sore throat, and neck pain.      - Respiratory: Negative for cough, sputum production, chest congestion, dyspnea, wheezing, and crackles.      - Cardiovascular: Negative for chest pain, palpitations, orthopnea, and bilateral lower extremity edema.       Exam:    /90 (BP Location: Left arm, Patient Position: Sitting, BP Cuff Size: Adult)   Pulse 68   Temp 37.2 °C (98.9 °F) (Temporal)   Ht 1.549 m (5' 1\")   Wt 75.3 kg (166 lb)   " SpO2 95%  Body mass index is 31.37 kg/m².    General:  Well nourished, well developed female in NAD  Head is grossly normal.  Neck: Supple without JVD or bruit. Thyroid is not enlarged.  No palpable lymphadenopathy, the area of concern feels to be part of the larynx on the left side  Pulmonary: Clear to ausculation and percussion.  Normal effort. No rales, ronchi, or wheezing.  Cardiovascular: Regular rate and rhythm without murmur. Carotid and radial pulses are intact and equal bilaterally.  Extremities: no clubbing, cyanosis, or edema.        Assessment/Plan:  1. Type 2 diabetes mellitus without complication, without long-term current use of insulin (HCC)  This is well controlled, continue metformin at her current dose.   - metFORMIN ER (GLUCOPHAGE XR) 500 MG TABLET SR 24 HR; Take 1 Tab by mouth every day.  Dispense: 90 Tab; Refill: 0  - lisinopril (PRINIVIL) 5 MG Tab; Take 1 Tab by mouth every day.  Dispense: 90 Tab; Refill: 0    2. Lump in neck  This appears to be anatomical and there are no palpable lumps on exam.  Consider US and/or laryngoscopy if sensation continues    3. Mixed hyperlipidemia  With the diagnosis of diabetes she does meet criteria for statin use.  We will start with lower dose to limit side effects which can be a concern in her age group  - omeprazole (PRILOSEC) 20 MG delayed-release capsule; Take 1 Cap by mouth every day.  Dispense: 90 Cap; Refill: 1  - rosuvastatin (CRESTOR) 5 MG Tab; Take 1 Tab by mouth every evening.  Dispense: 90 Tab; Refill: 0    4. Venous stasis dermatitis of left lower extremity  Continue lasix to reduce swelling  - furosemide (LASIX) 20 MG Tab; Take 1 Tab by mouth every day.  Dispense: 90 Tab; Refill: 0    5. Anxiety with flying  She has a flight coming up and gets nervous when flying.  She is wondering if there is something she can take.  Hydroxyzine is not without risks but is not controlled and can be considered.  She is advised to try it once before travel and is  aware it can make her sleepy  - hydrOXYzine HCl (ATARAX) 25 MG Tab; Take 1 Tab by mouth 3 times a day as needed for Anxiety.  Dispense: 15 Tab; Refill: 0

## 2019-11-12 NOTE — ASSESSMENT & PLAN NOTE
She has swelling at times in the left lower leg with a rash. She is using a topical steroid that helps.

## 2020-01-28 ENCOUNTER — APPOINTMENT (OUTPATIENT)
Dept: MEDICAL GROUP | Facility: PHYSICIAN GROUP | Age: 85
End: 2020-01-28
Payer: MEDICARE

## 2020-03-06 ENCOUNTER — TELEPHONE (OUTPATIENT)
Dept: MEDICAL GROUP | Facility: PHYSICIAN GROUP | Age: 85
End: 2020-03-06

## 2020-03-06 PROBLEM — Z76.0 ENCOUNTER FOR MEDICATION REFILL: Status: ACTIVE | Noted: 2020-03-06

## 2020-03-07 NOTE — TELEPHONE ENCOUNTER
Advised pt to come back to clinic for vitals recheck.  MA called and spoke with daughter multiple times. Pt's daughter stated she and her mother were having dinner and she had no concerns about her mother well being. Waited for approximately an hour and a half for the pt to come back, MA called pt's daughter, informed to take pt to ED if noted a change in LOC, respiratory status or other abnormal deviation from a baseline. Advised to take her vital signs at home, go to ED if abnormality is noted (low respirations below 12 BPM, low O2 below 92%, heart beat above 100). Based on pt's physical assessment pt did not exhibit respiratory distress, no noted abnormalities or decline in orientation during visit. Pt has a follow up appointment with provider on Tuesday 3/10/20.

## 2020-03-07 NOTE — TELEPHONE ENCOUNTER
Called pt's daughter to advise they did not have to come back to the clinic for vitals. Offered to r/s appt with another provider. Appt was moved to Tuesday w/ Dr. Machado. Kept appt on 3/26 in case it was needed. Advised of location and to monitor vitals over the weekend and if pt is in any respiratory distress to go to ER right away. Pt's daughter understood and thanked me for the call.

## 2020-03-07 NOTE — TELEPHONE ENCOUNTER
Called pt's daughter at 5pm and offered for her to bring back her mom to redo her vitals and make sure they were at safe numbers and she said they had just sat down for dinner so it would take time to bring her back    Called again at 6 after waiting an hour and the daughter said they were still at dinner so it would be another 30-45 minutes or more but eventually would make it back to us.

## 2020-03-10 ENCOUNTER — OFFICE VISIT (OUTPATIENT)
Dept: MEDICAL GROUP | Facility: PHYSICIAN GROUP | Age: 85
End: 2020-03-10
Payer: MEDICARE

## 2020-03-10 VITALS
HEIGHT: 61 IN | SYSTOLIC BLOOD PRESSURE: 120 MMHG | OXYGEN SATURATION: 95 % | DIASTOLIC BLOOD PRESSURE: 80 MMHG | HEART RATE: 62 BPM | RESPIRATION RATE: 16 BRPM | WEIGHT: 166.23 LBS | BODY MASS INDEX: 31.38 KG/M2 | TEMPERATURE: 99 F

## 2020-03-10 DIAGNOSIS — E11.9 TYPE 2 DIABETES MELLITUS WITHOUT COMPLICATION, WITHOUT LONG-TERM CURRENT USE OF INSULIN (HCC): ICD-10-CM

## 2020-03-10 DIAGNOSIS — I87.2 CHRONIC VENOUS INSUFFICIENCY OF LOWER EXTREMITY: ICD-10-CM

## 2020-03-10 DIAGNOSIS — G56.03 BILATERAL CARPAL TUNNEL SYNDROME: ICD-10-CM

## 2020-03-10 PROBLEM — R63.4 WEIGHT LOSS, UNINTENTIONAL: Status: RESOLVED | Noted: 2019-09-24 | Resolved: 2020-03-10

## 2020-03-10 PROBLEM — Z76.0 ENCOUNTER FOR MEDICATION REFILL: Status: RESOLVED | Noted: 2020-03-06 | Resolved: 2020-03-10

## 2020-03-10 PROCEDURE — 99214 OFFICE O/P EST MOD 30 MIN: CPT | Performed by: FAMILY MEDICINE

## 2020-03-10 RX ORDER — OMEPRAZOLE 20 MG/1
20 CAPSULE, DELAYED RELEASE ORAL DAILY
COMMUNITY
End: 2020-09-02 | Stop reason: SDUPTHER

## 2020-03-10 RX ORDER — FUROSEMIDE 20 MG/1
20 TABLET ORAL DAILY
COMMUNITY
End: 2020-04-23

## 2020-03-10 RX ORDER — CHOLECALCIFEROL (VITAMIN D3) 125 MCG
CAPSULE ORAL NIGHTLY PRN
COMMUNITY
End: 2020-04-23

## 2020-03-10 RX ORDER — HYDROXYZINE HYDROCHLORIDE 25 MG/1
25 TABLET, FILM COATED ORAL 3 TIMES DAILY PRN
COMMUNITY
End: 2020-04-07

## 2020-03-10 RX ORDER — LISINOPRIL 5 MG/1
5 TABLET ORAL DAILY
COMMUNITY
End: 2020-04-23

## 2020-03-10 RX ORDER — ALBUTEROL SULFATE 90 UG/1
2 AEROSOL, METERED RESPIRATORY (INHALATION) EVERY 6 HOURS PRN
COMMUNITY
End: 2020-04-23

## 2020-03-10 RX ORDER — ROSUVASTATIN CALCIUM 5 MG/1
5 TABLET, COATED ORAL DAILY
COMMUNITY
End: 2020-04-23

## 2020-03-10 RX ORDER — ALBUTEROL SULFATE 2.5 MG/3ML
2.5 SOLUTION RESPIRATORY (INHALATION) PRN
COMMUNITY
End: 2020-09-14

## 2020-03-10 ASSESSMENT — FIBROSIS 4 INDEX: FIB4 SCORE: 2.27

## 2020-03-11 NOTE — PROGRESS NOTES
"Complaint: F/u presyncopal episode     Subjective:     Jaylin Watkins is a 84 y.o. female here today accompanied by her daughter. Completely recovered from her \"episode\", daughter and patient thinks it might have been anxiety related.    Bilateral carpal tunnel syndrome  Still having tingling numbness in fingers both hands, day and night, no trouble with gripping object. Sxs started about 4 years ago.    Type 2 diabetes mellitus without complication, without long-term current use of insulin ([facility])  Has not had her labs checked since September. Currently on metformin 500 mg once a day. Last A1c was 5.9%.     Venous stasis dermatitis of left lower extremity  Rash has resolved, now wears thigh high compression stockings, less swelling but some issues with clumping.     No other concerns or complaints today.    Current medicines (including changes today)  Current Outpatient Medications   Medication Sig Dispense Refill   • metFORMIN (GLUCOPHAGE) 500 MG Tab Take 500 mg by mouth Once.     • omeprazole (PRILOSEC) 20 MG delayed-release capsule Take 20 mg by mouth every day.     • lisinopril (PRINIVIL) 5 MG Tab Take 5 mg by mouth every day.     • rosuvastatin (CRESTOR) 5 MG Tab Take 5 mg by mouth every day.     • hydrOXYzine HCl (ATARAX) 25 MG Tab Take 25 mg by mouth 3 times a day as needed for Itching.     • furosemide (LASIX) 20 MG Tab Take 20 mg by mouth every day.     • albuterol 108 (90 Base) MCG/ACT Aero Soln inhalation aerosol Inhale 2 Puffs by mouth every 6 hours as needed for Shortness of Breath.     • albuterol (PROVENTIL) 2.5mg/3ml Nebu Soln solution for nebulization 2.5 mg by Nebulization route as needed for Shortness of Breath.     • Melatonin 5 MG Tab Take  by mouth at bedtime as needed.       No current facility-administered medications for this visit.      She  has a past medical history of Carpal tunnel syndrome, Diabetes ([facility]), and Pneumonia.    Health Maintenance:       Allergies: Patient has no known " allergies.    Current Outpatient Medications Ordered in Epic   Medication Sig Dispense Refill   • metFORMIN (GLUCOPHAGE) 500 MG Tab Take 500 mg by mouth Once.     • omeprazole (PRILOSEC) 20 MG delayed-release capsule Take 20 mg by mouth every day.     • lisinopril (PRINIVIL) 5 MG Tab Take 5 mg by mouth every day.     • rosuvastatin (CRESTOR) 5 MG Tab Take 5 mg by mouth every day.     • hydrOXYzine HCl (ATARAX) 25 MG Tab Take 25 mg by mouth 3 times a day as needed for Itching.     • furosemide (LASIX) 20 MG Tab Take 20 mg by mouth every day.     • albuterol 108 (90 Base) MCG/ACT Aero Soln inhalation aerosol Inhale 2 Puffs by mouth every 6 hours as needed for Shortness of Breath.     • albuterol (PROVENTIL) 2.5mg/3ml Nebu Soln solution for nebulization 2.5 mg by Nebulization route as needed for Shortness of Breath.     • Melatonin 5 MG Tab Take  by mouth at bedtime as needed.       No current Epic-ordered facility-administered medications on file.        Past Medical History:   Diagnosis Date   • Carpal tunnel syndrome    • Diabetes (HCC)    • Pneumonia     four times       Past Surgical History:   Procedure Laterality Date   • CHOLECYSTECTOMY     • ABDOMINAL SUBTOTAL HYSTERECTOMY      ovaries remain   • TONSILLECTOMY         Social History     Tobacco Use   • Smoking status: Former Smoker     Years: 10.00     Types: Cigarettes     Last attempt to quit:      Years since quittin.2   • Smokeless tobacco: Never Used   Substance Use Topics   • Alcohol use: No   • Drug use: No       Social History     Social History Narrative    Retired - housekeeping       Family History   Problem Relation Age of Onset   • Cancer Mother         stomach   • Heart Disease Father 65   • Heart Disease Sister    • Cancer Sister         uterine   • Diabetes Brother    • Diabetes Brother    • Heart Attack Brother    • Heart Disease Brother         CABG         ROS Positive for swollen ankles, tingling/numbness in both hands/fingers  "24/7.  Patient denies any fever, chills, unintentional weight gain/loss, fatigue, stroke symptoms, dizziness, headache, nasal congestion, sore-throat, cough, heartburn, chest pain, difficulty breathing, abdominal discomfort, diarrhea/constipation, burning with urination or frequency, joint or back pain, skin rashes, depression or anxiety.       Objective:     /80 (BP Location: Left arm, Patient Position: Sitting, BP Cuff Size: Adult)   Pulse 62   Temp 37.2 °C (99 °F) (Temporal)   Resp 16   Ht 1.549 m (5' 1\")   Wt 75.4 kg (166 lb 3.6 oz)   SpO2 95%  Body mass index is 31.41 kg/m².   Physical Exam:  Constitutional: Alert, no distress.  Respiratory: Unlabored respiratory effort, lungs clear to auscultation, no wheezes, no ronchi.  Cardiovascular: Normal S1, S2, no murmur, 1+ ankle edema bilaterally  Psych: Alert and oriented x3, appropriate affect and mood.        Assessment and Plan:     The following treatment plan was discussed    1. Type 2 diabetes mellitus without complication, without long-term current use of insulin (HCC)   Controlled on med. Might be able to come off. Goal: A1c 7-8. Will notify with result.  - CBC WITH DIFFERENTIAL; Future  - Comp Metabolic Panel; Future  - HEMOGLOBIN A1C; Future    2. Bilateral carpal tunnel syndrome  - REFERRAL TO HAND SURGERY - [place] Fracture.    3. Chronic venous insufficiency of lower extremity  Continue Lasix, stockings. May take extra lasix in afternoon if needed.      Followup: Return in about 3 months (around 6/10/2020).    Please note that this dictation was created using voice recognition software. I have made every reasonable attempt to correct obvious errors, but I expect that there are errors of grammar and possibly content that I did not discover before finalizing the note.           "

## 2020-03-11 NOTE — ASSESSMENT & PLAN NOTE
Rash has resolved, now wears thigh high compression stockings, less swelling but some issues with clumping.

## 2020-03-11 NOTE — ASSESSMENT & PLAN NOTE
Still having tingling numbness in fingers both hands, day and night, no trouble with gripping object. Sxs started about 4 years ago.

## 2020-03-11 NOTE — ASSESSMENT & PLAN NOTE
Has not had her labs checked since September. Currently on metformin 500 mg once a day. Last A1c was 5.9%.

## 2020-03-23 ENCOUNTER — TELEPHONE (OUTPATIENT)
Dept: URGENT CARE | Facility: CLINIC | Age: 85
End: 2020-03-23

## 2020-03-23 NOTE — TELEPHONE ENCOUNTER
VOICEMAIL 1pm 03/23/2020    Need prescription for [facility]' for supplies. Please call her back [phone]

## 2020-03-24 ENCOUNTER — TELEPHONE (OUTPATIENT)
Dept: MEDICAL GROUP | Facility: PHYSICIAN GROUP | Age: 85
End: 2020-03-24

## 2020-03-24 NOTE — TELEPHONE ENCOUNTER
Spoke with patient and she states he Glucose meter broke and is needing a new one with the supplies for it. The two companies where she can go through with Medicare is either [employer] or [facility] [facility]. She will go with [facility] [facility] their phone is [phone].    Dr. VIEYRA can we order a new glucometer please

## 2020-03-24 NOTE — TELEPHONE ENCOUNTER
Phone Number Called: [phone] (home)       Call outcome: Spoke to patient regarding message below.    Message: Spoke to pt's daughter and informed her that per Dr. Machado her mother no longer needed to check her blood sugar levels; her diabetes is under good control and the medications wont cause the levels to dip

## 2020-04-07 ENCOUNTER — TELEPHONE (OUTPATIENT)
Dept: MEDICAL GROUP | Facility: PHYSICIAN GROUP | Age: 85
End: 2020-04-07

## 2020-04-07 RX ORDER — LORATADINE 10 MG/1
10 TABLET ORAL
COMMUNITY
Start: 2020-04-07

## 2020-04-07 NOTE — TELEPHONE ENCOUNTER
Patient called stating she received a letter from her insurance stating that the Hydroxyzine is not in her formulary. Will call pharmacy and/or Insurance to check if auth is needed.

## 2020-04-07 NOTE — TELEPHONE ENCOUNTER
Called patient and recommend patient to take Claritin (Loratadine) 10 mg daily as needed for itching.

## 2020-04-27 ENCOUNTER — TELEPHONE (OUTPATIENT)
Dept: MEDICAL GROUP | Facility: PHYSICIAN GROUP | Age: 85
End: 2020-04-27

## 2020-04-27 NOTE — TELEPHONE ENCOUNTER
DOCUMENTATION OF PAR STATUS:    1. Name of Medication & Dose: hydrOXYzine HCl (ATARAX) 25 MG Tab      2. Name of Prescription Coverage Company & phone #: [employer] [phone]    3. Date Prior Auth Submitted: 04/27/2020    4. What information was given to obtain insurance decision? In Chart    5. Prior Auth Status? Pending    6. Patient Notified: yes

## 2020-04-27 NOTE — TELEPHONE ENCOUNTER
MEDICATION PRIOR AUTHORIZATION NEEDED:    1. Name of Medication: hydrOXYzine HCl (ATARAX) 25 MG Tab     2. Requested By (Name of Pharmacy): [facility]      3. Is insurance on file current? Yes    4. What is the name & phone number of the 3rd party payor? [employer]

## 2020-05-19 RX ORDER — ROSUVASTATIN CALCIUM 5 MG/1
5 TABLET, COATED ORAL DAILY
Qty: 90 TAB | Refills: 0 | Status: SHIPPED | OUTPATIENT
Start: 2020-05-19 | End: 2020-06-17

## 2020-06-17 DIAGNOSIS — F40.243 ANXIETY WITH FLYING: ICD-10-CM

## 2020-06-18 RX ORDER — ROSUVASTATIN CALCIUM 5 MG/1
TABLET, COATED ORAL
Qty: 90 TAB | Refills: 0 | Status: SHIPPED | OUTPATIENT
Start: 2020-06-18 | End: 2020-09-14 | Stop reason: SDUPTHER

## 2020-06-18 RX ORDER — CHOLECALCIFEROL (VITAMIN D3) 125 MCG
CAPSULE ORAL
Qty: 90 TAB | Refills: 0 | Status: SHIPPED | OUTPATIENT
Start: 2020-06-18 | End: 2023-10-25

## 2020-06-18 RX ORDER — HYDROXYZINE HYDROCHLORIDE 25 MG/1
TABLET, FILM COATED ORAL
Qty: 90 TAB | Refills: 0 | Status: SHIPPED | OUTPATIENT
Start: 2020-06-18 | End: 2020-12-01 | Stop reason: SDUPTHER

## 2020-06-18 RX ORDER — ALBUTEROL SULFATE 90 UG/1
AEROSOL, METERED RESPIRATORY (INHALATION)
Qty: 1 INHALER | Refills: 3 | Status: SHIPPED
Start: 2020-06-18 | End: 2020-09-14

## 2020-06-18 RX ORDER — LISINOPRIL 5 MG/1
TABLET ORAL
Qty: 90 TAB | Refills: 0 | Status: SHIPPED | OUTPATIENT
Start: 2020-06-18 | End: 2020-09-14

## 2020-06-18 RX ORDER — FUROSEMIDE 20 MG/1
TABLET ORAL
Qty: 90 TAB | Refills: 0 | Status: SHIPPED | OUTPATIENT
Start: 2020-06-18 | End: 2020-09-14 | Stop reason: SDUPTHER

## 2020-08-11 ENCOUNTER — TELEPHONE (OUTPATIENT)
Dept: MEDICAL GROUP | Facility: PHYSICIAN GROUP | Age: 85
End: 2020-08-11

## 2020-08-11 NOTE — TELEPHONE ENCOUNTER
VOICEMAIL  1. Caller Name: Jaylin Watkins                        Call Back Number: [phone] (home)      2. Message: Patient called and left a message to get her lab orders faxed over to [facility]. I have called her a left her a message stating I will fax them over to [facility].     3. Patient approves office to leave a detailed voicemail/MyChart message: yes

## 2020-09-02 RX ORDER — OMEPRAZOLE 20 MG/1
20 CAPSULE, DELAYED RELEASE ORAL DAILY
Qty: 90 CAP | Refills: 0 | Status: SHIPPED | OUTPATIENT
Start: 2020-09-02 | End: 2020-12-01 | Stop reason: SDUPTHER

## 2020-09-14 ENCOUNTER — OFFICE VISIT (OUTPATIENT)
Dept: MEDICAL GROUP | Facility: PHYSICIAN GROUP | Age: 85
End: 2020-09-14
Payer: MEDICARE

## 2020-09-14 VITALS
SYSTOLIC BLOOD PRESSURE: 110 MMHG | HEIGHT: 61 IN | RESPIRATION RATE: 14 BRPM | BODY MASS INDEX: 32.28 KG/M2 | OXYGEN SATURATION: 90 % | HEART RATE: 78 BPM | DIASTOLIC BLOOD PRESSURE: 62 MMHG | WEIGHT: 171 LBS | TEMPERATURE: 97.4 F

## 2020-09-14 DIAGNOSIS — R22.1 LUMP IN NECK: ICD-10-CM

## 2020-09-14 DIAGNOSIS — D75.89 MACROCYTOSIS WITHOUT ANEMIA: ICD-10-CM

## 2020-09-14 DIAGNOSIS — M54.32 LEFT SIDED SCIATICA: ICD-10-CM

## 2020-09-14 DIAGNOSIS — E78.2 MIXED HYPERLIPIDEMIA: ICD-10-CM

## 2020-09-14 DIAGNOSIS — K21.9 GASTROESOPHAGEAL REFLUX DISEASE WITHOUT ESOPHAGITIS: ICD-10-CM

## 2020-09-14 DIAGNOSIS — I87.2 VENOUS STASIS DERMATITIS OF LEFT LOWER EXTREMITY: ICD-10-CM

## 2020-09-14 DIAGNOSIS — E66.9 OBESITY (BMI 30-39.9): ICD-10-CM

## 2020-09-14 DIAGNOSIS — R74.8 ELEVATED ALKALINE PHOSPHATASE LEVEL: ICD-10-CM

## 2020-09-14 DIAGNOSIS — J41.1 MUCOPURULENT CHRONIC BRONCHITIS (HCC): ICD-10-CM

## 2020-09-14 DIAGNOSIS — G56.01 CARPAL TUNNEL SYNDROME OF RIGHT WRIST: ICD-10-CM

## 2020-09-14 DIAGNOSIS — E11.9 TYPE 2 DIABETES MELLITUS WITHOUT COMPLICATION, WITHOUT LONG-TERM CURRENT USE OF INSULIN (HCC): ICD-10-CM

## 2020-09-14 PROCEDURE — 99214 OFFICE O/P EST MOD 30 MIN: CPT | Performed by: FAMILY MEDICINE

## 2020-09-14 RX ORDER — ROSUVASTATIN CALCIUM 5 MG/1
TABLET, COATED ORAL
Qty: 90 TAB | Refills: 3 | Status: SHIPPED | OUTPATIENT
Start: 2020-09-14 | End: 2023-10-25

## 2020-09-14 RX ORDER — GABAPENTIN 300 MG/1
CAPSULE ORAL
COMMUNITY
Start: 2020-09-02 | End: 2020-09-14 | Stop reason: SDUPTHER

## 2020-09-14 RX ORDER — METHYLPREDNISOLONE 4 MG/1
TABLET ORAL
Qty: 21 TAB | Refills: 0 | Status: SHIPPED | OUTPATIENT
Start: 2020-09-14 | End: 2020-12-01

## 2020-09-14 RX ORDER — LISINOPRIL 10 MG/1
10 TABLET ORAL DAILY
COMMUNITY
End: 2020-12-08

## 2020-09-14 RX ORDER — FUROSEMIDE 20 MG/1
TABLET ORAL
Qty: 90 TAB | Refills: 3 | Status: SHIPPED | OUTPATIENT
Start: 2020-09-14 | End: 2023-10-25

## 2020-09-14 RX ORDER — GABAPENTIN 300 MG/1
300 CAPSULE ORAL 2 TIMES DAILY
Qty: 180 CAP | Refills: 3 | Status: SHIPPED | OUTPATIENT
Start: 2020-09-14 | End: 2023-10-25

## 2020-09-14 ASSESSMENT — FIBROSIS 4 INDEX: FIB4 SCORE: 2.3

## 2020-09-14 NOTE — ASSESSMENT & PLAN NOTE
This is a chronic health problem that is uncontrolled with current medications and lifestyle measures.  Patient has gained 5 pounds since March.  Some of that could be water weight.

## 2020-09-14 NOTE — ASSESSMENT & PLAN NOTE
This evidently was abnormal on the patient's lab work in October 2019.  The lab to identify whether this was coming from liver or bone was canceled.  We will plan to repeat this blood work and see her back

## 2020-09-14 NOTE — ASSESSMENT & PLAN NOTE
This is a new problem for this patient that was treated with an epidural between L4-L5 15 years ago.  She is now noting that she is starting to have left-sided sciatica again.  The last couple nights is been so severe she has had to awaken her  and have him rub it for her.  It does get better with aspercream.  She is heading into a worsening problem

## 2020-09-14 NOTE — ASSESSMENT & PLAN NOTE
This was a problem found on the patient's blood work in the past in 2019.  We will repeat a CBC and include a B12 and folate to make sure that she does not have any vitamin deficiencies causing the problem.

## 2020-09-14 NOTE — ASSESSMENT & PLAN NOTE
This is a chronic health problem that the patient has had for approximately 10 years.  Sometime in the past her sister-in-law who is an RN told her that it was nothing to be worried about it was just a swollen gland.  Patient states that it has not changed in size, she has not had unintended weight loss or night sweats so I doubt this is a lymphoma.  She will let me know if it starts to change in size or becomes painful and we could then do imaging.

## 2020-09-14 NOTE — PROGRESS NOTES
CC:Diagnoses of Carpal tunnel syndrome of right wrist, Elevated alkaline phosphatase level, Gastroesophageal reflux disease without esophagitis, Lump in neck, Macrocytosis without anemia, Mixed hyperlipidemia, Type 2 diabetes mellitus without complication, without long-term current use of insulin (HCC), Venous stasis dermatitis of left lower extremity, Obesity (BMI 30-39.9), Mucopurulent chronic bronchitis (HCC), and Left sided sciatica were pertinent to this visit.    HISTORY OF PRESENT ILLNESS: Patient is a 85 y.o. female established patient who previously saw Dr. Cyr, now presents today to establish care and go over her chronic health problems.  She is accompanied by her adult daughter Nya with whom she resides along with her  of 64 years.      Carpal tunnel syndrome of right wrist  Is a chronic problem for this patient where she used to have it bilaterally and had the left wrist operated on but had some complications after surgery.  At this point were going to just keep her in a cock-up wrist splint to try and prevent the symptomatology that awakens her from sleep at night.  They will pick that up at the pharmacy and she will utilize that with us planning to hold off on getting the surgery at this time.    Elevated alkaline phosphatase level  This evidently was abnormal on the patient's lab work in October 2019.  The lab to identify whether this was coming from liver or bone was canceled.  We will plan to repeat this blood work and see her back    Gastroesophageal reflux disease without esophagitis  This is a chronic health problem well-controlled with omeprazole 20 mg daily.    Lump in neck  This is a chronic health problem that the patient has had for approximately 10 years.  Sometime in the past her sister-in-law who is an RN told her that it was nothing to be worried about it was just a swollen gland.  Patient states that it has not changed in size, she has not had unintended weight loss or night  sweats so I doubt this is a lymphoma.  She will let me know if it starts to change in size or becomes painful and we could then do imaging.    Macrocytosis without anemia  This was a problem found on the patient's blood work in the past in 2019.  We will repeat a CBC and include a B12 and folate to make sure that she does not have any vitamin deficiencies causing the problem.    Mixed hyperlipidemia  This is a chronic health problem for this patient where previously she was on rosuvastatin 5 mg daily.  Unfortunately she ran out after Dr. Mcahado had left and nobody refilled her medication.  We will send in a new prescription and recheck her labs in 4 weeks after she is back on her meds.    Type 2 diabetes mellitus without complication, without long-term current use of insulin (HCC)  This is a chronic health problem that is well controlled with current medications and lifestyle measures. She checks her blood sugar infrequently and her last reading was approximately 113.  We will check an A1c along with her other labs and then see her back.    Venous stasis dermatitis of left lower extremity  This is a chronic health problem for this patient for which she is supposed to be taking furosemide 20 mg daily.  She admits on days where she is going to be out of the house she will skip her dose.  I have asked that on those days she take 1/2 tablet or take her tablet as soon as she gets back to her home even if that is in the mid afternoon.  We want to just try and stay ahead of her swelling.    Obesity (BMI 30-39.9)  This is a chronic health problem that is uncontrolled with current medications and lifestyle measures.  Patient has gained 5 pounds since March.  Some of that could be water weight.    Mucopurulent chronic bronchitis (HCC)  Is a chronic health problem for this patient that at one time she had a nebulizer approximately 10 years ago.  We need to get her some new medication for it and she may need an actual new  nebulizer.  Were going to try a replacement hose to see if that works.  If it does not we will order a whole kit along with medication.  She does find that utilizing the nebulizer helps her to cough up the mucopurulent sputum and helps her to breathe better.    Left sided sciatica  This is a new problem for this patient that was treated with an epidural between L4-L5 15 years ago.  She is now noting that she is starting to have left-sided sciatica again.  The last couple nights is been so severe she has had to awaken her  and have him rub it for her.  It does get better with aspercream.  She is heading into a worsening problem       Patient Active Problem List    Diagnosis Date Noted   • Mucopurulent chronic bronchitis (HCC) 09/14/2020   • Left sided sciatica 09/14/2020   • Anxiety with flying 11/11/2019   • Lump in neck 10/31/2019   • Macrocytosis without anemia 10/07/2019   • Elevated alkaline phosphatase level 10/07/2019   • Osteopenia of lumbar spine 10/07/2019   • Carpal tunnel syndrome of right wrist 10/07/2019   • Mixed hyperlipidemia 01/30/2019   • Type 2 diabetes mellitus without complication, without long-term current use of insulin ([facility]) 01/15/2019   • Venous stasis dermatitis of left lower extremity 01/15/2019   • Gastroesophageal reflux disease without esophagitis 01/15/2019   • Obesity (BMI 30-39.9) 01/15/2019      Allergies:Patient has no known allergies.    Current Outpatient Medications   Medication Sig Dispense Refill   • lisinopril (PRINIVIL) 10 MG Tab Take 10 mg by mouth every day.     • rosuvastatin (CRESTOR) 5 MG Tab TAKE ONE TABLET BY MOUTH EVERY EVENING 90 Tab 3   • albuterol (PROVENTIL) 2.5mg/0.5ml Nebu Soln 0.5 mL by Nebulization route 4 times a day for 90 days. 180 mL 3   • methylPREDNISolone (MEDROL DOSEPAK) 4 MG Tablet Therapy Pack As directed on the packaging label. 21 Tab 0   • furosemide (LASIX) 20 MG Tab TAKE ONE TABLET BY MOUTH DAILY --LASIX 90 Tab 3   • metFORMIN  (GLUCOPHAGE) 500 MG Tab Take 1 Tab by mouth Once for 1 dose. 90 Tab 3   • gabapentin (NEURONTIN) 300 MG Cap Take 1 Cap by mouth 2 Times a Day. 180 Cap 3   • omeprazole (PRILOSEC) 20 MG delayed-release capsule Take 1 Cap by mouth every day. 90 Cap 0   • hydrOXYzine HCl (ATARAX) 25 MG Tab TAKE ONE TABLET BY MOUTH DAILY AS NEEDED FOR ANXIETY 90 Tab 0   • Melatonin 5 MG Tab TAKE ONE TABLET BY MOUTH AT BEDTIME AS NEEDED 90 Tab 0   • loratadine (CLARITIN) 10 MG Tab Take 1 Tab by mouth 1 time daily as needed (itching).       No current facility-administered medications for this visit.        Social History     Tobacco Use   • Smoking status: Former Smoker     Packs/day: 0.50     Years: 15.00     Pack years: 7.50     Types: Cigarettes     Quit date:      Years since quittin.7   • Smokeless tobacco: Never Used   Substance Use Topics   • Alcohol use: No   • Drug use: No     Social History     Social History Narrative    Retired - housekeeping       Family History   Problem Relation Age of Onset   • Cancer Mother         stomach   • Heart Disease Father 65   • Hypertension Father    • Hyperlipidemia Father    • Heart Disease Sister    • Cancer Sister         uterine   • Diabetes Brother    • Diabetes Brother    • Heart Attack Brother    • Heart Disease Brother         heart disease CABG 4        ROS:     - Constitutional:  Negative for fever, chills, unexpected weight change, and fatigue/generalized weakness.    - HEENT:  Negative for headaches, vision changes, hearing changes, ear pain, ear discharge, rhinorrhea, sinus congestion, sore throat, and neck pain.      - Respiratory: Negative for cough, sputum production, chest congestion, dyspnea, wheezing, and crackles.      - Cardiovascular: Negative for chest pain, palpitations, orthopnea, and bilateral lower extremity edema.     - Gastrointestinal: Negative for heartburn, nausea, vomiting, abdominal pain, hematochezia, melena, diarrhea, constipation, and  "greasy/foul-smelling stools.     - Genitourinary: Negative for dysuria, polyuria, hematuria, pyuria, urinary urgency, and urinary incontinence.     - Musculoskeletal:Positive for low back pain with left sided sciatica.     - Skin: Negative for rash, itching, cyanotic skin color change.     - Neurological: Left-sided sciatica otherwise denies dizziness, tingling, tremors, focal sensory deficit, focal weakness and headaches.     - Endo/Heme/Allergies: Does not bruise/bleed easily.     - Psychiatric/Behavioral: Negative for depression, suicidal/homicidal ideation and memory loss.          - NOTE: All other systems reviewed and are negative, except as in HPI.      Exam:    /62 (BP Location: Left arm, Patient Position: Sitting, BP Cuff Size: Adult)   Pulse 78   Temp 36.3 °C (97.4 °F) (Temporal)   Resp 14   Ht 1.549 m (5' 1\")   Wt 77.6 kg (171 lb)   SpO2 90%  Body mass index is 32.31 kg/m².    General:  Well nourished, well developed female in NAD  Head is grossly normal.  Neck: Supple without JVD or bruit. Thyroid is not enlarged.  Pulmonary: Clear to ausculation and percussion.  Normal effort. No rales, ronchi, or wheezing.  Cardiovascular: Regular rate and rhythm without murmur. Carotid and radial pulses are intact and equal bilaterally.  Extremities: no clubbing, cyanosis, or edema.  Back: Spinous processes are in good alignment nontender to palpation.  No paraspinous muscle spasm.  Straight leg raising is positive on the left    Please note that this dictation was created using voice recognition software. I have made every reasonable attempt to correct obvious errors, but I expect that there are errors of grammar and possibly content that I did not discover before finalizing the note.    Assessment/Plan:  1. Carpal tunnel syndrome of right wrist  Uncontrolled, we did have the patient get a new wrist splint to wear at bedtime on the right wrist and see if that takes care of her symptoms.    2. Elevated " alkaline phosphatase level  We will check isoenzymes and see her back to discuss  - ALKALINE PHOSPHATASE ISOENZYMES; Future    3. Gastroesophageal reflux disease without esophagitis  Adequately controlled with current meds    4. Lump in neck  Stable no further work-up needed    5. Macrocytosis without anemia  Unknown control, will recheck labs    6. Mixed hyperlipidemia  Uncontrolled currently because patient has not had her rosuvastatin.  They will get back on the medication and we will recheck labs in 4 weeks  - Comp Metabolic Panel; Future  - Lipid Profile; Future    7. Type 2 diabetes mellitus without complication, without long-term current use of insulin (HCC)  We will recheck labs and see her back to go over results  - POCT Retinal Eye Exam  - HEMOGLOBIN A1C; Future  - MICROALBUMIN CREAT RATIO URINE (LAB COLLECT); Future    8. Venous stasis dermatitis of left lower extremity  Patient will continue to take her Lasix even if she is going to be out of the home by taking her dose as soon as she gets home    9. Obesity (BMI 30-39.9)  Patient working on just trying to maintain her weight  - Patient identified as having weight management issue.  Appropriate orders and counseling given.    10. Mucopurulent chronic bronchitis (HCC)  Uncontrolled, we will get the patient back utilizing her nebulizer 4 times a day especially because the amount of smoke that is in the air.    11. Left sided sciatica  Uncontrolled, will try Medrol Dosepak see if we can calm down the pain and then get x-rays to see if we need to do further work-up  - DX-LUMBAR SPINE-2 OR 3 VIEWS; Future

## 2020-09-14 NOTE — ASSESSMENT & PLAN NOTE
This is a chronic health problem for this patient for which she is supposed to be taking furosemide 20 mg daily.  She admits on days where she is going to be out of the house she will skip her dose.  I have asked that on those days she take 1/2 tablet or take her tablet as soon as she gets back to her home even if that is in the mid afternoon.  We want to just try and stay ahead of her swelling.

## 2020-09-14 NOTE — ASSESSMENT & PLAN NOTE
This is a chronic health problem that is well controlled with current medications and lifestyle measures. She checks her blood sugar infrequently and her last reading was approximately 113.  We will check an A1c along with her other labs and then see her back.

## 2020-09-14 NOTE — ASSESSMENT & PLAN NOTE
This is a chronic health problem for this patient where previously she was on rosuvastatin 5 mg daily.  Unfortunately she ran out after Dr. Machado had left and nobody refilled her medication.  We will send in a new prescription and recheck her labs in 4 weeks after she is back on her meds.

## 2020-09-14 NOTE — ASSESSMENT & PLAN NOTE
Is a chronic health problem for this patient that at one time she had a nebulizer approximately 10 years ago.  We need to get her some new medication for it and she may need an actual new nebulizer.  Were going to try a replacement hose to see if that works.  If it does not we will order a whole kit along with medication.  She does find that utilizing the nebulizer helps her to cough up the mucopurulent sputum and helps her to breathe better.

## 2020-09-14 NOTE — ASSESSMENT & PLAN NOTE
Is a chronic problem for this patient where she used to have it bilaterally and had the left wrist operated on but had some complications after surgery.  At this point were going to just keep her in a cock-up wrist splint to try and prevent the symptomatology that awakens her from sleep at night.  They will pick that up at the pharmacy and she will utilize that with us planning to hold off on getting the surgery at this time.

## 2020-09-15 ENCOUNTER — TELEPHONE (OUTPATIENT)
Dept: MEDICAL GROUP | Facility: PHYSICIAN GROUP | Age: 85
End: 2020-09-15

## 2020-09-15 DIAGNOSIS — E11.9 TYPE 2 DIABETES MELLITUS WITHOUT COMPLICATION, WITHOUT LONG-TERM CURRENT USE OF INSULIN (HCC): ICD-10-CM

## 2020-09-15 RX ORDER — METFORMIN HYDROCHLORIDE 500 MG/1
500 TABLET, EXTENDED RELEASE ORAL DAILY
Qty: 90 TAB | Refills: 3 | Status: SHIPPED | OUTPATIENT
Start: 2020-09-15 | End: 2023-10-25

## 2020-09-15 NOTE — TELEPHONE ENCOUNTER
Received request for clarification from [facility] in [place].     Patient prescribed Metformin HCL 500mg tablets, per the pharmacy the patient, in the past, had been prescribed extended release tablets.     Pharmacy would like to verify that patient does not need ER.

## 2020-09-24 RX ORDER — ALBUTEROL SULFATE 2.5 MG/3ML
2.5 SOLUTION RESPIRATORY (INHALATION) EVERY 4 HOURS PRN
COMMUNITY
End: 2020-09-24 | Stop reason: SDUPTHER

## 2020-09-24 RX ORDER — ALBUTEROL SULFATE 2.5 MG/3ML
2.5 SOLUTION RESPIRATORY (INHALATION) EVERY 4 HOURS PRN
Qty: 120 BULLET | Refills: 3 | Status: SHIPPED | OUTPATIENT
Start: 2020-09-24 | End: 2023-10-25

## 2020-10-20 RX ORDER — LISINOPRIL 5 MG/1
TABLET ORAL
Qty: 90 TAB | Refills: 0 | Status: CANCELLED | OUTPATIENT
Start: 2020-10-20

## 2020-10-26 RX ORDER — LISINOPRIL 5 MG/1
TABLET ORAL
Qty: 90 TAB | Refills: 0 | Status: CANCELLED | OUTPATIENT
Start: 2020-10-26

## 2020-10-28 ENCOUNTER — HOSPITAL ENCOUNTER (OUTPATIENT)
Dept: LAB | Facility: MEDICAL CENTER | Age: 85
End: 2020-10-28
Attending: FAMILY MEDICINE
Payer: MEDICARE

## 2020-10-28 ENCOUNTER — APPOINTMENT (OUTPATIENT)
Dept: URGENT CARE | Facility: PHYSICIAN GROUP | Age: 85
End: 2020-10-28
Payer: MEDICARE

## 2020-10-28 ENCOUNTER — APPOINTMENT (OUTPATIENT)
Dept: RADIOLOGY | Facility: IMAGING CENTER | Age: 85
End: 2020-10-28
Attending: FAMILY MEDICINE
Payer: MEDICARE

## 2020-10-28 DIAGNOSIS — R74.8 ELEVATED ALKALINE PHOSPHATASE LEVEL: ICD-10-CM

## 2020-10-28 DIAGNOSIS — E78.2 MIXED HYPERLIPIDEMIA: ICD-10-CM

## 2020-10-28 DIAGNOSIS — M54.32 LEFT SIDED SCIATICA: ICD-10-CM

## 2020-10-28 DIAGNOSIS — E11.9 TYPE 2 DIABETES MELLITUS WITHOUT COMPLICATION, WITHOUT LONG-TERM CURRENT USE OF INSULIN (HCC): ICD-10-CM

## 2020-10-28 LAB
ALBUMIN SERPL BCP-MCNC: 4.2 G/DL (ref 3.2–4.9)
ALBUMIN/GLOB SERPL: 1.5 G/DL
ALP SERPL-CCNC: 108 U/L (ref 30–99)
ALT SERPL-CCNC: 13 U/L (ref 2–50)
ANION GAP SERPL CALC-SCNC: 11 MMOL/L (ref 7–16)
AST SERPL-CCNC: 20 U/L (ref 12–45)
BILIRUB SERPL-MCNC: 0.7 MG/DL (ref 0.1–1.5)
BUN SERPL-MCNC: 15 MG/DL (ref 8–22)
CALCIUM SERPL-MCNC: 9 MG/DL (ref 8.5–10.5)
CHLORIDE SERPL-SCNC: 103 MMOL/L (ref 96–112)
CHOLEST SERPL-MCNC: 153 MG/DL (ref 100–199)
CO2 SERPL-SCNC: 27 MMOL/L (ref 20–33)
CREAT SERPL-MCNC: 0.68 MG/DL (ref 0.5–1.4)
EST. AVERAGE GLUCOSE BLD GHB EST-MCNC: 154 MG/DL
FASTING STATUS PATIENT QL REPORTED: NORMAL
GLOBULIN SER CALC-MCNC: 2.8 G/DL (ref 1.9–3.5)
GLUCOSE SERPL-MCNC: 116 MG/DL (ref 65–99)
HBA1C MFR BLD: 7 % (ref 0–5.6)
HDLC SERPL-MCNC: 70 MG/DL
LDLC SERPL CALC-MCNC: 68 MG/DL
POTASSIUM SERPL-SCNC: 3.6 MMOL/L (ref 3.6–5.5)
PROT SERPL-MCNC: 7 G/DL (ref 6–8.2)
SODIUM SERPL-SCNC: 141 MMOL/L (ref 135–145)
TRIGL SERPL-MCNC: 73 MG/DL (ref 0–149)

## 2020-10-28 PROCEDURE — 84075 ASSAY ALKALINE PHOSPHATASE: CPT

## 2020-10-28 PROCEDURE — 36415 COLL VENOUS BLD VENIPUNCTURE: CPT

## 2020-10-28 PROCEDURE — 80061 LIPID PANEL: CPT

## 2020-10-28 PROCEDURE — 82570 ASSAY OF URINE CREATININE: CPT

## 2020-10-28 PROCEDURE — 82043 UR ALBUMIN QUANTITATIVE: CPT

## 2020-10-28 PROCEDURE — 84080 ASSAY ALKALINE PHOSPHATASES: CPT

## 2020-10-28 PROCEDURE — 83036 HEMOGLOBIN GLYCOSYLATED A1C: CPT | Mod: GA

## 2020-10-28 PROCEDURE — 72100 X-RAY EXAM L-S SPINE 2/3 VWS: CPT | Mod: TC,FY | Performed by: FAMILY MEDICINE

## 2020-10-28 PROCEDURE — 80053 COMPREHEN METABOLIC PANEL: CPT

## 2020-10-29 LAB
CREAT UR-MCNC: 114.7 MG/DL
MICROALBUMIN UR-MCNC: <1.2 MG/DL
MICROALBUMIN/CREAT UR: NORMAL MG/G (ref 0–30)

## 2020-10-31 LAB
ALP BONE SERPL-CCNC: 45 U/L (ref 0–55)
ALP ISOS SERPL HS-CCNC: 0 U/L
ALP LIVER SERPL-CCNC: 73 U/L (ref 0–94)
ALP SERPL-CCNC: 118 U/L (ref 40–120)

## 2020-11-10 RX ORDER — LISINOPRIL 5 MG/1
TABLET ORAL
Qty: 90 TAB | Refills: 3 | Status: SHIPPED | OUTPATIENT
Start: 2020-11-10 | End: 2023-10-25

## 2020-12-01 ENCOUNTER — OFFICE VISIT (OUTPATIENT)
Dept: MEDICAL GROUP | Facility: PHYSICIAN GROUP | Age: 85
End: 2020-12-01
Payer: MEDICARE

## 2020-12-01 VITALS
HEIGHT: 61 IN | BODY MASS INDEX: 32.02 KG/M2 | TEMPERATURE: 98.9 F | DIASTOLIC BLOOD PRESSURE: 50 MMHG | RESPIRATION RATE: 14 BRPM | OXYGEN SATURATION: 83 % | SYSTOLIC BLOOD PRESSURE: 110 MMHG | WEIGHT: 169.6 LBS | HEART RATE: 81 BPM

## 2020-12-01 DIAGNOSIS — E78.2 MIXED HYPERLIPIDEMIA: ICD-10-CM

## 2020-12-01 DIAGNOSIS — E11.9 TYPE 2 DIABETES MELLITUS WITHOUT COMPLICATION, WITHOUT LONG-TERM CURRENT USE OF INSULIN (HCC): ICD-10-CM

## 2020-12-01 DIAGNOSIS — R74.8 ELEVATED ALKALINE PHOSPHATASE LEVEL: ICD-10-CM

## 2020-12-01 DIAGNOSIS — F40.243 ANXIETY WITH FLYING: ICD-10-CM

## 2020-12-01 DIAGNOSIS — M54.32 LEFT SIDED SCIATICA: ICD-10-CM

## 2020-12-01 DIAGNOSIS — L20.84 INTRINSIC ECZEMA: ICD-10-CM

## 2020-12-01 PROCEDURE — 99214 OFFICE O/P EST MOD 30 MIN: CPT | Performed by: FAMILY MEDICINE

## 2020-12-01 RX ORDER — MELOXICAM 15 MG/1
15 TABLET ORAL DAILY
Qty: 90 TAB | Refills: 3 | Status: SHIPPED
Start: 2020-12-01 | End: 2020-12-08 | Stop reason: SINTOL

## 2020-12-01 RX ORDER — HYDROXYZINE HYDROCHLORIDE 25 MG/1
TABLET, FILM COATED ORAL
Qty: 90 TAB | Refills: 3 | Status: SHIPPED | OUTPATIENT
Start: 2020-12-01 | End: 2023-10-25

## 2020-12-01 RX ORDER — OMEPRAZOLE 20 MG/1
20 CAPSULE, DELAYED RELEASE ORAL DAILY
Qty: 90 CAP | Refills: 3 | Status: SHIPPED | OUTPATIENT
Start: 2020-12-01 | End: 2023-10-25

## 2020-12-01 RX ORDER — TRIAMCINOLONE ACETONIDE 1 MG/G
OINTMENT TOPICAL
Qty: 453.6 G | Refills: 3 | Status: SHIPPED | OUTPATIENT
Start: 2020-12-01 | End: 2023-10-25

## 2020-12-01 ASSESSMENT — FIBROSIS 4 INDEX: FIB4 SCORE: 2.22

## 2020-12-01 NOTE — ASSESSMENT & PLAN NOTE
This is a chronic health problem for this patient where we started her on rosuvastatin 5 mg daily and it has completely normalized her cholesterol panel.  Total cholesterol is now down to 153, triglycerides 73, HDL went up to 70 which is protective for heart and her LDL is down to 68.  There is no liver dysfunction.  We will have her continue with meds.

## 2020-12-01 NOTE — ASSESSMENT & PLAN NOTE
This is a chronic health problem for which the patient takes Metformin extended release 500 mg once a day.  Her fasting glucose is excellent at 116 and her A1c is excellent at 7.0.  Her microalbumin/creatinine ratio is negative so no signs of any kidney problems from the diabetes.

## 2020-12-01 NOTE — ASSESSMENT & PLAN NOTE
This is a chronic health problem for which we did do isoenzymes this time.  On her comprehensive metabolic panel her alkaline phosphatase was 108 which is elevated according to their legend.  We then did isoenzymes where her total was 118 and there legend allows up to 120, liver function is normal at 73 bone function is normal at 45.  We will continue to just monitor.

## 2020-12-01 NOTE — ASSESSMENT & PLAN NOTE
This is a chronic health problem where this patient has a breakout that will come on either one of her ankles and currently she has a breakout on the right ankle lateral portion.  Is actually fairly large with classic eczema demarcations.  They have been trying over-the-counter medications and alternating to try and get this better including bag balm, cortisone 10 and others such as antiitch.  She states that this always itches and it is painful.  At this point were going to use triamcinolone ointment because the environment here is so dry I want to try and keep more moisture at the skin level for her.  They will do it twice a day until it clears up and then will hold onto it because it will recur

## 2020-12-01 NOTE — ASSESSMENT & PLAN NOTE
This is a chronic health problem where the patient did do x-rays of the lumbar spine come back showing degenerative subluxations at L2-3 and L4-5 as well as multilevel facet arthropathy and endplate spurring consistent with arthritis.  Patient really is not interested in a surgery at this time.  She does not take anything for arthritis and I would like to try her on a medication to see if we might be able to get her feeling better and not having as much trouble with her back.  She does say when her back acts up she can still get it to feel better with Aspercreme so a daily anti-inflammatory should work.

## 2020-12-08 ENCOUNTER — NURSE TRIAGE (OUTPATIENT)
Dept: HEALTH INFORMATION MANAGEMENT | Facility: OTHER | Age: 85
End: 2020-12-08

## 2020-12-08 ENCOUNTER — OFFICE VISIT (OUTPATIENT)
Dept: MEDICAL GROUP | Facility: PHYSICIAN GROUP | Age: 85
End: 2020-12-08
Payer: MEDICARE

## 2020-12-08 VITALS
OXYGEN SATURATION: 88 % | WEIGHT: 169 LBS | HEIGHT: 60 IN | TEMPERATURE: 100.1 F | DIASTOLIC BLOOD PRESSURE: 70 MMHG | BODY MASS INDEX: 33.18 KG/M2 | SYSTOLIC BLOOD PRESSURE: 130 MMHG | HEART RATE: 83 BPM | RESPIRATION RATE: 20 BRPM

## 2020-12-08 DIAGNOSIS — M79.89 BILATERAL SWELLING OF FEET: ICD-10-CM

## 2020-12-08 DIAGNOSIS — I87.2 VENOUS STASIS DERMATITIS OF LEFT LOWER EXTREMITY: ICD-10-CM

## 2020-12-08 DIAGNOSIS — H61.91 SKIN LESION OF RIGHT EAR: ICD-10-CM

## 2020-12-08 PROCEDURE — 99214 OFFICE O/P EST MOD 30 MIN: CPT | Performed by: FAMILY MEDICINE

## 2020-12-08 RX ORDER — LANCETS 30 GAUGE
EACH MISCELLANEOUS
Qty: 100 EACH | Refills: 0 | Status: SHIPPED | OUTPATIENT
Start: 2020-12-08 | End: 2023-10-25

## 2020-12-08 ASSESSMENT — FIBROSIS 4 INDEX: FIB4 SCORE: 2.22

## 2020-12-08 NOTE — PROGRESS NOTES
CC:Diagnoses of Skin lesion of right ear, Venous stasis dermatitis of left lower extremity, and Bilateral swelling of feet were pertinent to this visit.    HISTORY OF PRESENT ILLNESS: Patient is a 85 y.o. female established patient who presents today to talk about several concerns 1 being her right foot that she feels is more swollen than the left but upon exam there is bilateral foot swelling because her feet are not in a dependent position most of the time.      Skin lesion of right ear  Patient has a skin lesion on her right ear helix that comes and goes but sometimes bleeds.  It appears to be a possible skin cancer.  In light of where it is and the way it would have to be treated were going to send her to dermatology.    Venous stasis dermatitis of left lower extremity  This is getting much better with her using the triamcinolone ointment 0.1%.  Her erythema is healing most places there still is some on the lateral portions of both lower extremities.    Bilateral swelling of feet  This is a problem for this patient that is related to her feet being in a dependent position most of the days.  She had noted that she thought she had some bruising on the lateral right foot and that it was more swollen than on the left foot.  On exam there is no bruising and there is some mild edema similar to what she has on the left leg.  We will have her start trying to rest for 20 minutes with her feet higher than her hips at least twice a day preferably about 10 AM and 2 PM.      Patient Active Problem List    Diagnosis Date Noted   • Skin lesion of right ear 12/08/2020   • Bilateral swelling of feet 12/08/2020   • Intrinsic eczema 12/01/2020   • Mucopurulent chronic bronchitis (HCC) 09/14/2020   • Left sided sciatica 09/14/2020   • Anxiety with flying 11/11/2019   • Lump in neck 10/31/2019   • Macrocytosis without anemia 10/07/2019   • Elevated alkaline phosphatase level 10/07/2019   • Osteopenia of lumbar spine 10/07/2019   •  Carpal tunnel syndrome of right wrist 10/07/2019   • Mixed hyperlipidemia 01/30/2019   • Type 2 diabetes mellitus without complication, without long-term current use of insulin ([facility]) 01/15/2019   • Venous stasis dermatitis of left lower extremity 01/15/2019   • Gastroesophageal reflux disease without esophagitis 01/15/2019   • Obesity (BMI 30-39.9) 01/15/2019      Allergies:Patient has no known allergies.    Current Outpatient Medications   Medication Sig Dispense Refill   • Blood Glucose Meter Kit Test blood sugar as recommended by provider. One Touch Ultra 2 blood glucose monitoring kit. 1 Kit 0   • Blood Glucose Test Strips Use one One Touch Ultra 2 strip to test blood sugar twice daily. 300 Each 3   • Lancets Use one One Touch Ultra 2 lancet to test blood sugar twice daily. 100 Each 0   • triamcinolone acetonide (KENALOG) 0.1 % Ointment Apply a small amount to affected area twice daily till clear 453.6 g 3   • hydrOXYzine HCl (ATARAX) 25 MG Tab TAKE ONE TABLET BY MOUTH DAILY AS NEEDED FOR ANXIETY 90 Tab 3   • omeprazole (PRILOSEC) 20 MG delayed-release capsule Take 1 Cap by mouth every day. 90 Cap 3   • lisinopril (PRINIVIL) 5 MG Tab TAKE ONE TABLET BY MOUTH DAILY - PRINIVIL 90 Tab 3   • albuterol (PROVENTIL) 2.5mg/3ml Nebu Soln solution for nebulization 3 mL by Nebulization route every four hours as needed. 120 Bullet 3   • Respiratory Therapy Supplies (NEBULIZER) Device Nebulizer Device 1 Each 0   • metFORMIN ER (GLUCOPHAGE XR) 500 MG TABLET SR 24 HR Take 1 Tab by mouth every day. 90 Tab 3   • rosuvastatin (CRESTOR) 5 MG Tab TAKE ONE TABLET BY MOUTH EVERY EVENING 90 Tab 3   • albuterol (PROVENTIL) 2.5mg/0.5ml Nebu Soln 0.5 mL by Nebulization route 4 times a day for 90 days. 180 mL 3   • furosemide (LASIX) 20 MG Tab TAKE ONE TABLET BY MOUTH DAILY --LASIX 90 Tab 3   • gabapentin (NEURONTIN) 300 MG Cap Take 1 Cap by mouth 2 Times a Day. 180 Cap 3   • Melatonin 5 MG Tab TAKE ONE TABLET BY MOUTH AT BEDTIME AS  NEEDED 90 Tab 0   • loratadine (CLARITIN) 10 MG Tab Take 1 Tab by mouth 1 time daily as needed (itching).       No current facility-administered medications for this visit.        Social History     Tobacco Use   • Smoking status: Former Smoker     Packs/day: 0.50     Years: 15.00     Pack years: 7.50     Types: Cigarettes     Quit date:      Years since quittin.9   • Smokeless tobacco: Never Used   Substance Use Topics   • Alcohol use: No   • Drug use: No     Social History     Social History Narrative    Retired - housekeeping       Family History   Problem Relation Age of Onset   • Cancer Mother         stomach   • Heart Disease Father 65   • Hypertension Father    • Hyperlipidemia Father    • Heart Disease Sister    • Cancer Sister         uterine   • Diabetes Brother    • Diabetes Brother    • Heart Attack Brother    • Heart Disease Brother         heart disease CABG 4        ROS:     - Constitutional:  Negative for fever, chills, unexpected weight change, and fatigue/generalized weakness.    - HEENT:  Negative for headaches, vision changes, hearing changes, ear pain, ear discharge, rhinorrhea, sinus congestion, sore throat, and neck pain.      - Respiratory: Negative for cough, sputum production, chest congestion, dyspnea, wheezing, and crackles.      - Cardiovascular: Bilateral lower extremity edema secondary to stasis otherwise denies chest pain, palpitations, orthopnea, and bilateral lower extremity edema.     - Gastrointestinal: Negative for heartburn, nausea, vomiting, abdominal pain, hematochezia, melena, diarrhea, constipation, and greasy/foul-smelling stools.     - Genitourinary: Negative for dysuria, polyuria, hematuria, pyuria, urinary urgency, and urinary incontinence.     - Musculoskeletal: Positive for osteoarthritis multiple sites intolerant of meloxicam.      - Skin: Negative for rash, itching, cyanotic skin color change.     - Neurological: Negative for dizziness, tingling, tremors,  focal sensory deficit, focal weakness and headaches.     - Endo/Heme/Allergies: Does not bruise/bleed easily.     - Psychiatric/Behavioral: Negative for depression, suicidal/homicidal ideation and memory loss.          - NOTE: All other systems reviewed and are negative, except as in HPI.      Exam:    /70 (BP Location: Right arm, Patient Position: Sitting, BP Cuff Size: Adult)   Pulse 83   Temp 37.8 °C (100.1 °F) (Temporal)   Resp 20   Ht 1.524 m (5')   Wt 76.7 kg (169 lb)   SpO2 88%  Body mass index is 33.01 kg/m².    General:  Well nourished, well developed female in NAD  Head is grossly normal.  Neck: Supple without JVD or bruit. Thyroid is not enlarged.  Pulmonary: Clear to ausculation and percussion.  Normal effort. No rales, ronchi, or wheezing.  Cardiovascular: Regular rate and rhythm without murmur. Carotid and radial pulses are intact and equal bilaterally.  Extremities: Mild nonpitting edema to the ankles bilaterally, the right foot has multiple varicose veins but I do not see any sign of injury and its nontender to palpation    Please note that this dictation was created using voice recognition software. I have made every reasonable attempt to correct obvious errors, but I expect that there are errors of grammar and possibly content that I did not discover before finalizing the note.    Assessment/Plan:  1. 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 Venous stasis dermatitis of left lower extremity  This seems to be improving with use of the triamcinolone cream    3. Bilateral swelling of feet  Patient needs to rest with her feet elevated higher than her hips for at least 20 minutes twice a day.  She will work on this.

## 2020-12-08 NOTE — TELEPHONE ENCOUNTER
Patient has swollen foot with slight discoloration, no respiratory symptoms  1. Caller Name: Nya               Call Back Number: [phone]  [facility] PCP or Specialty Provider: Yes Dede         2.  Has the patient previously tested positive for COVID-19? No    3.  In the last two weeks, has the patient had any new or worsening symptoms (not explained by alternative diagnosis)? No.    4.  Does patient have any comoribidities? DM    5.  Has the patient had any known contact with someone who is suspected or confirmed to have COVID-19? No.    5. Disposition: Cleared by RN Triage as potential is low for COVID-19; OK to keep/schedule appointment    Note routed to [facility] Provider: [place] only.

## 2020-12-08 NOTE — ASSESSMENT & PLAN NOTE
Patient has a skin lesion on her right ear helix that comes and goes but sometimes bleeds.  It appears to be a possible skin cancer.  In light of where it is and the way it would have to be treated were going to send her to dermatology.

## 2020-12-08 NOTE — ASSESSMENT & PLAN NOTE
This is getting much better with her using the triamcinolone ointment 0.1%.  Her erythema is healing most places there still is some on the lateral portions of both lower extremities.

## 2020-12-08 NOTE — ASSESSMENT & PLAN NOTE
This is a problem for this patient that is related to her feet being in a dependent position most of the days.  She had noted that she thought she had some bruising on the lateral right foot and that it was more swollen than on the left foot.  On exam there is no bruising and there is some mild edema similar to what she has on the left leg.  We will have her start trying to rest for 20 minutes with her feet higher than her hips at least twice a day preferably about 10 AM and 2 PM.

## 2020-12-08 NOTE — TELEPHONE ENCOUNTER
Regarding: Next course of action   ----- Message from Sophie Nieves sent at 12/8/2020 10:57 AM PST -----  Swelling foot from medication. Declined UC

## 2020-12-30 ENCOUNTER — APPOINTMENT (RX ONLY)
Dept: URBAN - METROPOLITAN AREA CLINIC 31 | Facility: CLINIC | Age: 85
Setting detail: DERMATOLOGY
End: 2020-12-30

## 2020-12-30 DIAGNOSIS — H61.03 CHONDRITIS OF EXTERNAL EAR: ICD-10-CM

## 2020-12-30 DIAGNOSIS — I87.2 VENOUS INSUFFICIENCY (CHRONIC) (PERIPHERAL): ICD-10-CM

## 2020-12-30 PROBLEM — H61.031 CHONDRITIS OF RIGHT EXTERNAL EAR: Status: ACTIVE | Noted: 2020-12-30

## 2020-12-30 PROCEDURE — 99202 OFFICE O/P NEW SF 15 MIN: CPT

## 2020-12-30 PROCEDURE — ? ADDITIONAL NOTES

## 2020-12-30 PROCEDURE — ? COUNSELING

## 2020-12-30 ASSESSMENT — LOCATION ZONE DERM
LOCATION ZONE: LEG
LOCATION ZONE: EAR

## 2020-12-30 ASSESSMENT — LOCATION DETAILED DESCRIPTION DERM
LOCATION DETAILED: RIGHT SUPERIOR HELIX
LOCATION DETAILED: RIGHT DISTAL PRETIBIAL REGION

## 2020-12-30 ASSESSMENT — LOCATION SIMPLE DESCRIPTION DERM
LOCATION SIMPLE: RIGHT EAR
LOCATION SIMPLE: RIGHT PRETIBIAL REGION

## 2020-12-30 NOTE — PROCEDURE: ADDITIONAL NOTES
Detail Level: Detailed
Additional Notes: Recommended patient the use of a side pillow or cut out pillow to avoid pressure on right ear while sleeping.
Additional Notes: Continue TAC ointment prn as Rx'd by PCP

## 2021-01-05 ENCOUNTER — NURSE TRIAGE (OUTPATIENT)
Dept: HEALTH INFORMATION MANAGEMENT | Facility: OTHER | Age: 86
End: 2021-01-05

## 2021-01-05 NOTE — TELEPHONE ENCOUNTER
"Pt's relative Hi calling about pt. Discussed that he is not on the list of authorized individuals that I can discuss treatment and billing with. He is not with pt now. He is going to take pt to UC per report. Pt will call back with questions or concerns with Hi to authorize him to discuss treatment and billing if she wishes.    Answer Assessment - Initial Assessment Questions  1. REASON FOR CALL or QUESTION: \"What is your reason for calling today?\" or \"How can I best help you?\" or \"What question do you have that I can help answer?\"      Pt's relative would like to know if the [facility] is testing people for COVID. Not on authorized list of individuals I can discuss treatment and billing with.    Protocols used: INFORMATION ONLY CALL - NO TRIAGE-A-[place]      "

## 2021-01-05 NOTE — TELEPHONE ENCOUNTER
----- Message from Cici Treadwell, Med Ass't sent at 1/5/2021  8:28 AM PST -----  PATIENT IS EXPERIENCING H/A, FATIGUE, TEMP FOR LAST 5 DAYS. LEISA'S DAUGHTER WAS POSITIVE BACK IN November .

## 2021-01-11 DIAGNOSIS — Z23 NEED FOR VACCINATION: ICD-10-CM

## 2021-05-25 ENCOUNTER — HOSPITAL ENCOUNTER (OUTPATIENT)
Dept: LAB | Facility: MEDICAL CENTER | Age: 86
End: 2021-05-25
Attending: FAMILY MEDICINE
Payer: COMMERCIAL

## 2021-05-25 PROCEDURE — 80061 LIPID PANEL: CPT

## 2021-05-25 PROCEDURE — 36415 COLL VENOUS BLD VENIPUNCTURE: CPT

## 2021-05-25 PROCEDURE — 85025 COMPLETE CBC W/AUTO DIFF WBC: CPT

## 2021-05-25 PROCEDURE — 84080 ASSAY ALKALINE PHOSPHATASES: CPT

## 2021-05-25 PROCEDURE — 84075 ASSAY ALKALINE PHOSPHATASE: CPT | Mod: XU

## 2021-05-25 PROCEDURE — 80053 COMPREHEN METABOLIC PANEL: CPT

## 2021-05-26 LAB
ALBUMIN SERPL BCP-MCNC: 4 G/DL (ref 3.2–4.9)
ALBUMIN/GLOB SERPL: 1.3 G/DL
ALP SERPL-CCNC: 134 U/L (ref 30–99)
ALT SERPL-CCNC: 14 U/L (ref 2–50)
ANION GAP SERPL CALC-SCNC: 11 MMOL/L (ref 7–16)
AST SERPL-CCNC: 30 U/L (ref 12–45)
BASOPHILS # BLD AUTO: 1.1 % (ref 0–1.8)
BASOPHILS # BLD: 0.06 K/UL (ref 0–0.12)
BILIRUB SERPL-MCNC: 0.7 MG/DL (ref 0.1–1.5)
BUN SERPL-MCNC: 20 MG/DL (ref 8–22)
CALCIUM SERPL-MCNC: 9 MG/DL (ref 8.5–10.5)
CHLORIDE SERPL-SCNC: 104 MMOL/L (ref 96–112)
CHOLEST SERPL-MCNC: 128 MG/DL (ref 100–199)
CO2 SERPL-SCNC: 25 MMOL/L (ref 20–33)
CREAT SERPL-MCNC: 0.75 MG/DL (ref 0.5–1.4)
EOSINOPHIL # BLD AUTO: 0.06 K/UL (ref 0–0.51)
EOSINOPHIL NFR BLD: 1.1 % (ref 0–6.9)
ERYTHROCYTE [DISTWIDTH] IN BLOOD BY AUTOMATED COUNT: 57.6 FL (ref 35.9–50)
FASTING STATUS PATIENT QL REPORTED: NORMAL
GLOBULIN SER CALC-MCNC: 3.2 G/DL (ref 1.9–3.5)
GLUCOSE SERPL-MCNC: 99 MG/DL (ref 65–99)
HCT VFR BLD AUTO: 46.6 % (ref 37–47)
HDLC SERPL-MCNC: 65 MG/DL
HGB BLD-MCNC: 14.8 G/DL (ref 12–16)
IMM GRANULOCYTES # BLD AUTO: 0.02 K/UL (ref 0–0.11)
IMM GRANULOCYTES NFR BLD AUTO: 0.4 % (ref 0–0.9)
LDLC SERPL CALC-MCNC: 55 MG/DL
LYMPHOCYTES # BLD AUTO: 1.2 K/UL (ref 1–4.8)
LYMPHOCYTES NFR BLD: 21.9 % (ref 22–41)
MCH RBC QN AUTO: 30.5 PG (ref 27–33)
MCHC RBC AUTO-ENTMCNC: 31.8 G/DL (ref 33.6–35)
MCV RBC AUTO: 96.1 FL (ref 81.4–97.8)
MONOCYTES # BLD AUTO: 0.61 K/UL (ref 0–0.85)
MONOCYTES NFR BLD AUTO: 11.2 % (ref 0–13.4)
NEUTROPHILS # BLD AUTO: 3.52 K/UL (ref 2–7.15)
NEUTROPHILS NFR BLD: 64.3 % (ref 44–72)
NRBC # BLD AUTO: 0 K/UL
NRBC BLD-RTO: 0 /100 WBC
PLATELET # BLD AUTO: 210 K/UL (ref 164–446)
PMV BLD AUTO: 10.6 FL (ref 9–12.9)
POTASSIUM SERPL-SCNC: 4.1 MMOL/L (ref 3.6–5.5)
PROT SERPL-MCNC: 7.2 G/DL (ref 6–8.2)
RBC # BLD AUTO: 4.85 M/UL (ref 4.2–5.4)
SODIUM SERPL-SCNC: 140 MMOL/L (ref 135–145)
TRIGL SERPL-MCNC: 41 MG/DL (ref 0–149)
WBC # BLD AUTO: 5.5 K/UL (ref 4.8–10.8)

## 2021-05-29 LAB
ALP BONE SERPL-CCNC: 53 U/L (ref 0–55)
ALP ISOS SERPL HS-CCNC: 0 U/L
ALP LIVER SERPL-CCNC: 89 U/L (ref 0–94)
ALP SERPL-CCNC: 142 U/L (ref 40–120)